# Patient Record
Sex: FEMALE | Race: WHITE | NOT HISPANIC OR LATINO | Employment: FULL TIME | URBAN - METROPOLITAN AREA
[De-identification: names, ages, dates, MRNs, and addresses within clinical notes are randomized per-mention and may not be internally consistent; named-entity substitution may affect disease eponyms.]

---

## 2017-10-20 ENCOUNTER — OFFICE VISIT (OUTPATIENT)
Dept: URGENT CARE | Facility: PHYSICIAN GROUP | Age: 57
End: 2017-10-20
Payer: COMMERCIAL

## 2017-10-20 VITALS
BODY MASS INDEX: 35.73 KG/M2 | WEIGHT: 182 LBS | HEART RATE: 96 BPM | DIASTOLIC BLOOD PRESSURE: 98 MMHG | RESPIRATION RATE: 16 BRPM | OXYGEN SATURATION: 100 % | SYSTOLIC BLOOD PRESSURE: 150 MMHG | TEMPERATURE: 98 F | HEIGHT: 60 IN

## 2017-10-20 DIAGNOSIS — R31.9 URINARY TRACT INFECTION WITH HEMATURIA, SITE UNSPECIFIED: ICD-10-CM

## 2017-10-20 DIAGNOSIS — R39.9 UTI SYMPTOMS: ICD-10-CM

## 2017-10-20 DIAGNOSIS — N39.0 URINARY TRACT INFECTION WITH HEMATURIA, SITE UNSPECIFIED: ICD-10-CM

## 2017-10-20 DIAGNOSIS — R30.0 DYSURIA: ICD-10-CM

## 2017-10-20 LAB
APPEARANCE UR: CLEAR
BILIRUB UR STRIP-MCNC: NORMAL MG/DL
COLOR UR AUTO: NORMAL
GLUCOSE UR STRIP.AUTO-MCNC: NORMAL MG/DL
KETONES UR STRIP.AUTO-MCNC: NORMAL MG/DL
LEUKOCYTE ESTERASE UR QL STRIP.AUTO: NORMAL
NITRITE UR QL STRIP.AUTO: NORMAL
PH UR STRIP.AUTO: 5 [PH] (ref 5–8)
PROT UR QL STRIP: NORMAL MG/DL
RBC UR QL AUTO: NORMAL
SP GR UR STRIP.AUTO: 1.01
UROBILINOGEN UR STRIP-MCNC: NORMAL MG/DL

## 2017-10-20 PROCEDURE — 81002 URINALYSIS NONAUTO W/O SCOPE: CPT | Performed by: NURSE PRACTITIONER

## 2017-10-20 PROCEDURE — 99204 OFFICE O/P NEW MOD 45 MIN: CPT | Performed by: NURSE PRACTITIONER

## 2017-10-20 RX ORDER — NITROFURANTOIN 25; 75 MG/1; MG/1
100 CAPSULE ORAL EVERY 12 HOURS
Qty: 10 CAP | Refills: 0 | Status: SHIPPED | OUTPATIENT
Start: 2017-10-20 | End: 2017-10-25

## 2017-10-20 ASSESSMENT — ENCOUNTER SYMPTOMS
FEVER: 0
DIZZINESS: 0
ABDOMINAL PAIN: 1
BACK PAIN: 1
DIARRHEA: 0
NAUSEA: 0
FLANK PAIN: 0
VOMITING: 0
CHILLS: 0
CONSTIPATION: 0
HEADACHES: 0
WEAKNESS: 0
MYALGIAS: 1

## 2017-10-21 NOTE — PATIENT INSTRUCTIONS
Urinary Tract Infection  A urinary tract infection (UTI) can occur any place along the urinary tract. The tract includes the kidneys, ureters, bladder, and urethra. A type of germ called bacteria often causes a UTI. UTIs are often helped with antibiotic medicine.   HOME CARE   · If given, take antibiotics as told by your doctor. Finish them even if you start to feel better.  · Drink enough fluids to keep your pee (urine) clear or pale yellow.  · Avoid tea, drinks with caffeine, and bubbly (carbonated) drinks.  · Pee often. Avoid holding your pee in for a long time.  · Pee before and after having sex (intercourse).  · Wipe from front to back after you poop (bowel movement) if you are a woman. Use each tissue only once.  GET HELP RIGHT AWAY IF:   · You have back pain.  · You have lower belly (abdominal) pain.  · You have chills.  · You feel sick to your stomach (nauseous).  · You throw up (vomit).  · Your burning or discomfort with peeing does not go away.  · You have a fever.  · Your symptoms are not better in 3 days.  MAKE SURE YOU:   · Understand these instructions.  · Will watch your condition.  · Will get help right away if you are not doing well or get worse.     This information is not intended to replace advice given to you by your health care provider. Make sure you discuss any questions you have with your health care provider.     Document Released: 06/05/2009 Document Revised: 01/08/2016 Document Reviewed: 07/18/2013  [employer] Interactive Patient Education ©2016 [employer]

## 2017-10-21 NOTE — PROGRESS NOTES
Subjective:      Simi Massey is a 57 y.o. female who presents with Urinary Frequency (low back pain starting last night )            HPI  Simi is a 57 year old female who is here for urinary problems since last night. States has been on a road trip last week. Denies fever, n/v, diarrhea. Has low pelvic pressure, bilat low back pain.    PMH:  has no past medical history of Breast cancer (CMS-HCC).  MEDS:   Current Outpatient Prescriptions:   •  nitrofurantoin monohydr macro (MACROBID) 100 MG Cap, Take 1 Cap by mouth every 12 hours for 5 days., Disp: 10 Cap, Rfl: 0  •  aspirin (ASA) 325 MG TABS, Take 650 mg by mouth 2 times a day as needed., Disp: , Rfl:   •  Chlorphen-Phenyleph-ASA (JE-SELTZER PLUS COLD PO), Take  by mouth., Disp: , Rfl:   •  hydrocod polst-chlorphen polst (TUSSIONEX PENNKINETIC ER) 10-8 MG/5ML LQCR, Take 5 mL by mouth every 12 hours as needed for Cough., Disp: 120 mL, Rfl: 0  •  Cholecalciferol (VITAMIN D3) 2000 UNIT CAPS, Take 1 Cap by mouth every day., Disp: , Rfl:   •  B Complex Vitamins (VITAMIN B COMPLEX) CAPS, Take 1 Cap by mouth every day.  , Disp: , Rfl:   •  lipoic acid POWD, Take 200 mg by mouth every day.  , Disp: , Rfl:   •  LYSINE, Take 1 Tab by mouth every day.  , Disp: , Rfl:   •  Coenzyme Q10 (CO Q 10) 10 MG CAPS, Take 1 Cap by mouth every day. With red yeast rice , Disp: , Rfl:   •  Magnesium Hydroxide (MAGNESIA PO), Take 1 Tab by mouth every day.  , Disp: , Rfl:   ALLERGIES:   Allergies   Allergen Reactions   • Nkda [No Known Drug Allergy]      SURGHX:   Past Surgical History:   Procedure Laterality Date   • APPENDECTOMY      1965     SOCHX:  reports that she has been smoking Cigarettes.  She has been smoking about 1.00 pack per day. She has never used smokeless tobacco. She reports that she does not drink alcohol or use drugs.  FH: Family history was reviewed, no pertinent findings to report    Review of Systems   Constitutional: Negative for chills, fever and  malaise/fatigue.   Gastrointestinal: Positive for abdominal pain. Negative for constipation, diarrhea, nausea and vomiting.   Genitourinary: Positive for dysuria, frequency and urgency. Negative for flank pain and hematuria.   Musculoskeletal: Positive for back pain and myalgias.   Neurological: Negative for dizziness, weakness and headaches.   All other systems reviewed and are negative.         Objective:     /98   Pulse 96   Temp 36.7 °C (98 °F)   Resp 16   Ht 1.524 m (5')   Wt 82.6 kg (182 lb)   SpO2 100%   BMI 35.54 kg/m²      Physical Exam   Constitutional: She is oriented to person, place, and time. Vital signs are normal. She appears well-developed and well-nourished. She is active and cooperative.  Non-toxic appearance. She does not have a sickly appearance. She does not appear ill. No distress.   HENT:   Head: Normocephalic.   Eyes: Conjunctivae and EOM are normal. Pupils are equal, round, and reactive to light.   Neck: Normal range of motion. Neck supple.   Cardiovascular: Normal rate and regular rhythm.    Pulmonary/Chest: Effort normal and breath sounds normal.   Abdominal: Soft. Bowel sounds are normal. She exhibits no distension. There is no tenderness. There is no rigidity, no rebound, no guarding and no CVA tenderness.   Musculoskeletal: Normal range of motion.        Lumbar back: She exhibits normal range of motion, no tenderness, no bony tenderness, no swelling, no edema, no deformity, no pain and no spasm.   Neurological: She is alert and oriented to person, place, and time.   Skin: Skin is warm and dry. She is not diaphoretic.   Vitals reviewed.    POC Color Straw Negative Final   POC Appearance Clear Negative Final   POC Leukocyte Esterase Neg Negative Final   POC Nitrites Neg Negative Final   POC Urobiligen Neg Negative (0.2) mg/dL Final   POC Protein Neg Negative mg/dL Final   POC Urine PH 5.0 5.0 - 8.0 Final   POC Blood Trace Negative Final   POC Specific Gravity 1.015 <1.005 -  >1.030 Final   POC Ketones Trace Negative mg/dL Final   POC Biliruben Neg Negative mg/dL Final   POC Glucose Neg Negative mg/dL Final             Declines urine culture at this time, just treatment  Assessment/Plan:     1. Dysuria    - POCT Urinalysis    2. Urinary tract infection with hematuria, site unspecified    - POCT Urinalysis    3. UTI symptoms    - nitrofurantoin monohydr macro (MACROBID) 100 MG Cap; Take 1 Cap by mouth every 12 hours for 5 days.  Dispense: 10 Cap; Refill: 0    Increase water intake  Urinate more frequently and empty bladder completely  Practice good toileting hygiene after bowel movements and sexual intercourse, refrain from sexual intercourse until infection cleared  Monitor for back/flank pain, difficulty urinating, blood in urine- need re-evaluation

## 2018-05-12 ENCOUNTER — OFFICE VISIT (OUTPATIENT)
Dept: URGENT CARE | Facility: PHYSICIAN GROUP | Age: 58
End: 2018-05-12
Payer: COMMERCIAL

## 2018-05-12 VITALS
TEMPERATURE: 99 F | HEIGHT: 60 IN | SYSTOLIC BLOOD PRESSURE: 140 MMHG | OXYGEN SATURATION: 96 % | BODY MASS INDEX: 35.77 KG/M2 | DIASTOLIC BLOOD PRESSURE: 90 MMHG | HEART RATE: 82 BPM | WEIGHT: 182.2 LBS

## 2018-05-12 DIAGNOSIS — B02.9 HERPES ZOSTER WITHOUT COMPLICATION: ICD-10-CM

## 2018-05-12 PROCEDURE — 99213 OFFICE O/P EST LOW 20 MIN: CPT | Performed by: NURSE PRACTITIONER

## 2018-05-12 RX ORDER — VALACYCLOVIR HYDROCHLORIDE 1 G/1
1000 TABLET, FILM COATED ORAL 3 TIMES DAILY
Qty: 21 TAB | Refills: 0 | Status: SHIPPED | OUTPATIENT
Start: 2018-05-12 | End: 2018-05-21

## 2018-05-12 ASSESSMENT — ENCOUNTER SYMPTOMS
MUSCULOSKELETAL NEGATIVE: 1
NEUROLOGICAL NEGATIVE: 1
CONSTITUTIONAL NEGATIVE: 1
MYALGIAS: 0
CARDIOVASCULAR NEGATIVE: 1
RESPIRATORY NEGATIVE: 1

## 2018-05-12 NOTE — PROGRESS NOTES
Subjective:      Simi Massey is a 58 y.o. female who presents with Rash (rash located on back, poss shingles, pt diagnosed with shingles before, onset at 9am )            HPI  Pt states that she noticed a rash to her back this am. Hx of shingles a few years ago and rash is in the same spot.   No pain, itchiness or burning sensation  No fevers  + stress  No other complaints  No other rash noted to body  No fevers      PMH:  has no past medical history of Breast cancer (HCC).  MEDS:   Current Outpatient Prescriptions:   •  valacyclovir (VALTREX) 1 GM Tab, Take 1 Tab by mouth 3 times a day., Disp: 21 Tab, Rfl: 0  •  Chlorphen-Phenyleph-ASA (JE-SELTZER PLUS COLD PO), Take  by mouth., Disp: , Rfl:   •  Cholecalciferol (VITAMIN D3) 2000 UNIT CAPS, Take 1 Cap by mouth every day., Disp: , Rfl:   •  B Complex Vitamins (VITAMIN B COMPLEX) CAPS, Take 1 Cap by mouth every day.  , Disp: , Rfl:   •  lipoic acid POWD, Take 200 mg by mouth every day.  , Disp: , Rfl:   •  LYSINE, Take 1 Tab by mouth every day.  , Disp: , Rfl:   •  Coenzyme Q10 (CO Q 10) 10 MG CAPS, Take 1 Cap by mouth every day. With red yeast rice , Disp: , Rfl:   •  Magnesium Hydroxide (MAGNESIA PO), Take 1 Tab by mouth every day.  , Disp: , Rfl:   •  aspirin (ASA) 325 MG TABS, Take 650 mg by mouth 2 times a day as needed., Disp: , Rfl:   •  hydrocod polst-chlorphen polst (TUSSIONEX PENNKINETIC ER) 10-8 MG/5ML LQCR, Take 5 mL by mouth every 12 hours as needed for Cough., Disp: 120 mL, Rfl: 0  ALLERGIES:   Allergies   Allergen Reactions   • Nkda [No Known Drug Allergy]      SURGHX:   Past Surgical History:   Procedure Laterality Date   • APPENDECTOMY      1965     SOCHX:  reports that she has been smoking Cigarettes.  She has been smoking about 1.00 pack per day. She has never used smokeless tobacco. She reports that she does not drink alcohol or use drugs.  FH: family history includes Lung Disease in her father and mother.      Review of Systems      Constitutional: Negative.  Negative for malaise/fatigue.   Respiratory: Negative.    Cardiovascular: Negative.    Musculoskeletal: Negative.  Negative for myalgias.   Skin: Positive for rash.   Neurological: Negative.           Objective:     /90   Pulse 82   Temp 37.2 °C (99 °F)   Ht 1.524 m (5')   Wt 82.6 kg (182 lb 3.2 oz)   SpO2 96%   BMI 35.58 kg/m²      Physical Exam   Constitutional: She is oriented to person, place, and time. She appears well-developed and well-nourished.   HENT:   Head: Normocephalic and atraumatic.   Eyes: Conjunctivae are normal.   Neck: Normal range of motion.   Pulmonary/Chest: Effort normal.   Musculoskeletal: Normal range of motion.   Neurological: She is alert and oriented to person, place, and time.   Skin: Skin is warm and dry. Capillary refill takes less than 2 seconds.        Scattered lesions noted to left upper back  No vesicals forming.  No signs of secondary infection   Psychiatric: She has a normal mood and affect. Her behavior is normal. Judgment and thought content normal.               Assessment/Plan:     There are no diagnoses linked to this encounter.

## 2018-05-12 NOTE — PATIENT INSTRUCTIONS
Shingles  Shingles is an infection that causes a painful skin rash and fluid-filled blisters. Shingles is caused by the same virus that causes chickenpox.  Shingles only develops in people who:  · Have had chickenpox.  · Have gotten the chickenpox vaccine. (This is rare.)  The first symptoms of shingles may be itching, tingling, or pain in an area on your skin. A rash will follow in a few days or weeks. The rash is usually on one side of the body in a bandlike or beltlike pattern. Over time, the rash turns into fluid-filled blisters that break open, scab over, and dry up. Medicines may:  · Help you manage pain.  · Help you recover more quickly.  · Help to prevent long-term problems.  Follow these instructions at home:  Medicines  · Take medicines only as told by your doctor.  · Apply an anti-itch or numbing cream to the affected area as told by your doctor.  Blister and Rash Care  · Take a cool bath or put cool compresses on the area of the rash or blisters as told by your doctor. This may help with pain and itching.  · Keep your rash covered with a loose bandage (dressing). Wear loose-fitting clothing.  · Keep your rash and blisters clean with mild soap and cool water or as told by your doctor.  · Check your rash every day for signs of infection. These include redness, swelling, and pain that lasts or gets worse.  · Do not pick your blisters.  · Do not scratch your rash.  General instructions  · Rest as told by your doctor.  · Keep all follow-up visits as told by your doctor. This is important.  · Until your blisters scab over, your infection can cause chickenpox in people who have never had it or been vaccinated against it. To prevent this from happening, avoid touching other people or being around other people, especially:  ¨ Babies.  ¨ Pregnant women.  ¨ Children who have eczema.  ¨ Elderly people who have transplants.  ¨ People who have chronic illnesses, such as leukemia or AIDS.  Contact a doctor if:  · Your  pain does not get better with medicine.  · Your pain does not get better after the rash heals.  · Your rash looks infected. Signs of infection include:  ¨ Redness.  ¨ Swelling.  ¨ Pain that lasts or gets worse.  Get help right away if:  · The rash is on your face or nose.  · You have pain in your face, pain around your eye area, or loss of feeling on one side of your face.  · You have ear pain or you have ringing in your ear.  · You have loss of taste.  · Your condition gets worse.  This information is not intended to replace advice given to you by your health care provider. Make sure you discuss any questions you have with your health care provider.  Document Released: 06/05/2009 Document Revised: 08/13/2017 Document Reviewed: 09/29/2015  [employer] Interactive Patient Education © 2017 [employer]

## 2018-05-21 ENCOUNTER — OFFICE VISIT (OUTPATIENT)
Dept: INTERNAL MEDICINE | Facility: MEDICAL CENTER | Age: 58
End: 2018-05-21
Payer: COMMERCIAL

## 2018-05-21 VITALS
HEIGHT: 63 IN | DIASTOLIC BLOOD PRESSURE: 98 MMHG | HEART RATE: 80 BPM | TEMPERATURE: 97 F | OXYGEN SATURATION: 97 % | WEIGHT: 180 LBS | SYSTOLIC BLOOD PRESSURE: 166 MMHG | BODY MASS INDEX: 31.89 KG/M2

## 2018-05-21 DIAGNOSIS — Z76.89 ENCOUNTER TO ESTABLISH CARE: ICD-10-CM

## 2018-05-21 DIAGNOSIS — R79.89 ABNORMAL CBC: ICD-10-CM

## 2018-05-21 DIAGNOSIS — B02.9 HERPES ZOSTER WITHOUT COMPLICATION: ICD-10-CM

## 2018-05-21 DIAGNOSIS — R03.0 ELEVATED BP WITHOUT DIAGNOSIS OF HYPERTENSION: ICD-10-CM

## 2018-05-21 DIAGNOSIS — Z13.228 SCREENING FOR METABOLIC DISORDER: ICD-10-CM

## 2018-05-21 DIAGNOSIS — Z12.39 SCREENING FOR BREAST CANCER: ICD-10-CM

## 2018-05-21 DIAGNOSIS — E55.9 VITAMIN D INSUFFICIENCY: ICD-10-CM

## 2018-05-21 DIAGNOSIS — Z11.9 SCREENING EXAMINATION FOR INFECTIOUS DISEASE: ICD-10-CM

## 2018-05-21 DIAGNOSIS — Z12.11 SCREEN FOR COLON CANCER: ICD-10-CM

## 2018-05-21 PROCEDURE — 99386 PREV VISIT NEW AGE 40-64: CPT | Mod: GC | Performed by: INTERNAL MEDICINE

## 2018-05-21 RX ORDER — AMPICILLIN TRIHYDRATE 250 MG
CAPSULE ORAL
COMMUNITY
End: 2018-10-25

## 2018-05-21 ASSESSMENT — PATIENT HEALTH QUESTIONNAIRE - PHQ9: CLINICAL INTERPRETATION OF PHQ2 SCORE: 0

## 2018-05-21 ASSESSMENT — ENCOUNTER SYMPTOMS
PALPITATIONS: 0
VOMITING: 0
COUGH: 0
WEIGHT LOSS: 0
EYE PAIN: 0
SENSORY CHANGE: 0
BLOOD IN STOOL: 0
HEADACHES: 0
ABDOMINAL PAIN: 0
MYALGIAS: 0
SINUS PAIN: 0
SHORTNESS OF BREATH: 0
CHILLS: 0
INSOMNIA: 0
SPUTUM PRODUCTION: 0
SPEECH CHANGE: 0
FEVER: 0
DEPRESSION: 0
CONSTIPATION: 0
EYE DISCHARGE: 0
NAUSEA: 0

## 2018-05-21 ASSESSMENT — LIFESTYLE VARIABLES: SUBSTANCE_ABUSE: 0

## 2018-05-21 NOTE — PROGRESS NOTES
Annual Wellness Exam    CC: Wellness visit.    HPI: Patient is a 58 y.o female here for an annual wellness exam. Patient's preventive service needs are listedin the assessment and plan. Patient has not seen a PCP in 5 years and would like to be evaluated and treated for any active issues. She has no complaints at this time.    ROS:  Review of Systems   Constitutional: Negative for chills, fever, malaise/fatigue and weight loss.   HENT: Negative for congestion, ear discharge, hearing loss and sinus pain.    Eyes: Negative for pain and discharge.   Respiratory: Negative for cough, sputum production and shortness of breath.    Cardiovascular: Negative for chest pain, palpitations and leg swelling.   Gastrointestinal: Negative for abdominal pain, blood in stool, constipation, nausea and vomiting.   Genitourinary: Negative for dysuria and urgency.   Musculoskeletal: Negative for myalgias.   Skin: Positive for rash. Negative for itching.   Neurological: Negative for sensory change, speech change and headaches.   Psychiatric/Behavioral: Negative for depression, substance abuse and suicidal ideas. The patient does not have insomnia.        Patient Active Problem List    Diagnosis Date Noted   • Herpes zoster without complication 05/21/2018   • Vitamin D insufficiency 06/14/2013   • Mood disorder (HCC) 01/18/2013   • Elevated blood pressure reading without diagnosis of hypertension 10/12/2012   • Menopause 10/12/2012   • Abnormal CBC 10/12/2012   • Sleep-disordered breathing 10/12/2012   • Hyperlipidemia 09/07/2012   • Elevated hemoglobin A1c 09/07/2012   • Snoring 09/07/2012   • Tobacco use 09/07/2012       Past Medical History:   Diagnosis Date   • Anxiety    • Depression    • Head ache    • Shingles        Current Outpatient Prescriptions   Medication Sig Dispense Refill   • Red Yeast Rice 600 MG Cap Take  by mouth.     • Cholecalciferol (VITAMIN D3) 5000 units Cap Take 1 Cap by mouth every day.      "  • B Complex Vitamins (VITAMIN B COMPLEX) CAPS Take 1 Cap by mouth every day.       • lipoic acid POWD Take 200 mg by mouth every day.       • LYSINE Take 1 Tab by mouth every day.       • Coenzyme Q10 (CO Q 10) 10 MG CAPS Take 1 Cap by mouth every day. With red yeast rice      • Magnesium Hydroxide (MAGNESIA PO) Take 1 Tab by mouth every day.       • aspirin (ASA) 325 MG TABS Take 650 mg by mouth 2 times a day as needed.       No current facility-administered medications for this visit.        Allergies as of 05/21/2018 - Reviewed 05/21/2018   Allergen Reaction Noted   • Nkda [no known drug allergy]  09/07/2012       Social History     Social History   • Marital status:      Spouse name: N/A   • Number of children: N/A   • Years of education: N/A     Occupational History   • Not on file.     Social History Main Topics   • Smoking status: Current Every Day Smoker     Packs/day: 1.00     Types: Cigarettes   • Smokeless tobacco: Never Used      Comment: trying to quit   • Alcohol use Yes      Comment: once or twice a year    • Drug use: No   • Sexual activity: Yes     Partners: Male     Other Topics Concern   • Not on file     Social History Narrative   • No narrative on file       Family History   Problem Relation Age of Onset   • Lung Disease Mother    • Cancer Maternal Grandmother      Lung       Past Surgical History:   Procedure Laterality Date   • APPENDECTOMY      1965       Physical Exam:  BP (!) 166/98   Pulse 80   Temp 36.1 °C (97 °F)   Ht 1.6 m (5' 3\")   Wt 81.6 kg (180 lb)   SpO2 97%   BMI 31.89 kg/m²   Physical Exam   Constitutional: She is oriented to person, place, and time. She appears well-developed and well-nourished. No distress.   HENT:   Head: Normocephalic and atraumatic.   Right Ear: Tympanic membrane, external ear and ear canal normal.   Left Ear: Tympanic membrane, external ear and ear canal normal.   Eyes: Conjunctivae and EOM are normal. Pupils are equal, round, and reactive to " light.   Neck: Normal range of motion. Neck supple. No thyromegaly present.   Cardiovascular: Normal rate, regular rhythm, normal heart sounds and intact distal pulses.  Exam reveals no gallop and no friction rub.    No murmur heard.  Pulmonary/Chest: Effort normal and breath sounds normal. She has no wheezes. She has no rales.   Abdominal: Soft. Bowel sounds are normal. She exhibits no distension. There is no guarding.   Musculoskeletal: She exhibits no edema or deformity.   Neurological: She is alert and oriented to person, place, and time. No sensory deficit.   Skin: Skin is warm and dry. Rash (healing rash from recent shingles infection at about left  T8 dermatome postriorly) noted. She is not diaphoretic.         Note: I have reviewed all pertinent labs and diagnostic tests associated with this visit with specific comments listed under the assessment and plan below    Assessment and Plan:    Immunization:   · PNA: not indicated  · Influenza vaccination: patient not immunized, vaccine recommended.  · Tdap/TD: last TD vaccine in 2015  · Zoster vaccine: recommend Shingrix.  · HBV:  no history of immunization, screening recommended       · HAV:  no history of immunization, screening recommended        Comments: Screen for Hep A and B. Recommend patient get shingrix vaccine at pharmacy.    Infections:    · HCV: patient was born between [birth year], has high risk behavior screening recommended     · HBV:  no history of immunization, screening recommended      · HIV: patient was not screened before, screening recommended      · Syphilis, Chlamydia and Gonorrhea:  no high risk behavior, screening is not recommended   Comments: Screen for Hep A, B, and C.    Metabolic History:    · Coronary artery disease and stroke: lipid profile is abnormal,  ASCVD is incalculable as lipid profile 5 years old.   · HTN: hypertensive at this visit.   · Diabetes: patient has risk factors for developing DM, screening  recommended.  Comments: Patient without screening labs in the past 5 years. Get CMP, Lipid profile, A1C for further evaluation.    Cancer Screening:    · PAP Smear: last PAP smear was done in ?,  was normal , next PAP next visit.  · Mammogram: normal 5 years ago.  · Colonoscopy: normal 10 years ago.   · Lung cancer: 30 PPY, screening recommended.  Comments: Patient needs colonoscopy, mammogram, pap smear.    Lifestyle and Functioning:   · Smokin pack per day  · Physical activity: no formal exercise program, walks at work.  · Alcohol use: rarely  · Obesity: patient's BMI is 31.  · Depression: PHQ2 score 0.  Comments: Counseled patient on diet and exercise. Counseled patient on smoking cessation.    Elevated BP without diagnosis of hypertension  Patient had an elevated blood pressure reading at her visit. Reports no prior diagnosis of hypertension or ever being on medications.  - Recheck blood pressure at next visit; we'll possibly start patient on treatment.    Vitamin D deficiency  Patient has history of vitamin D deficiency, not currently on a supplement, no recent vitamin D levels.  - Vitamin D level    History of abnormal CBC  - Repeat CBC for follow-up      Followup: Return in about 2 weeks (around 2018) for Long; for PAP, HTN, and labs.    Signed by: Radha Garsia M.D.

## 2018-05-30 ENCOUNTER — HOSPITAL ENCOUNTER (OUTPATIENT)
Dept: LAB | Facility: MEDICAL CENTER | Age: 58
End: 2018-05-30
Attending: STUDENT IN AN ORGANIZED HEALTH CARE EDUCATION/TRAINING PROGRAM
Payer: COMMERCIAL

## 2018-05-30 DIAGNOSIS — E55.9 VITAMIN D INSUFFICIENCY: ICD-10-CM

## 2018-05-30 DIAGNOSIS — Z11.9 SCREENING EXAMINATION FOR INFECTIOUS DISEASE: ICD-10-CM

## 2018-05-30 DIAGNOSIS — Z13.228 SCREENING FOR METABOLIC DISORDER: ICD-10-CM

## 2018-05-30 DIAGNOSIS — Z76.89 ENCOUNTER TO ESTABLISH CARE: ICD-10-CM

## 2018-05-30 LAB
25(OH)D3 SERPL-MCNC: 38 NG/ML (ref 30–100)
ALBUMIN SERPL BCP-MCNC: 4.2 G/DL (ref 3.2–4.9)
ALBUMIN/GLOB SERPL: 1.7 G/DL
ALP SERPL-CCNC: 65 U/L (ref 30–99)
ALT SERPL-CCNC: 14 U/L (ref 2–50)
ANION GAP SERPL CALC-SCNC: 6 MMOL/L (ref 0–11.9)
AST SERPL-CCNC: 14 U/L (ref 12–45)
BASOPHILS # BLD AUTO: 1 % (ref 0–1.8)
BASOPHILS # BLD: 0.08 K/UL (ref 0–0.12)
BILIRUB SERPL-MCNC: 0.5 MG/DL (ref 0.1–1.5)
BUN SERPL-MCNC: 8 MG/DL (ref 8–22)
CALCIUM SERPL-MCNC: 9.3 MG/DL (ref 8.5–10.5)
CHLORIDE SERPL-SCNC: 106 MMOL/L (ref 96–112)
CHOLEST SERPL-MCNC: 204 MG/DL (ref 100–199)
CO2 SERPL-SCNC: 28 MMOL/L (ref 20–33)
CREAT SERPL-MCNC: 0.69 MG/DL (ref 0.5–1.4)
EOSINOPHIL # BLD AUTO: 0.18 K/UL (ref 0–0.51)
EOSINOPHIL NFR BLD: 2.1 % (ref 0–6.9)
ERYTHROCYTE [DISTWIDTH] IN BLOOD BY AUTOMATED COUNT: 46.8 FL (ref 35.9–50)
EST. AVERAGE GLUCOSE BLD GHB EST-MCNC: 123 MG/DL
GLOBULIN SER CALC-MCNC: 2.5 G/DL (ref 1.9–3.5)
GLUCOSE SERPL-MCNC: 99 MG/DL (ref 65–99)
HBA1C MFR BLD: 5.9 % (ref 0–5.6)
HBV SURFACE AB SERPL IA-ACNC: <3.1 MIU/ML (ref 0–10)
HCT VFR BLD AUTO: 45.9 % (ref 37–47)
HCV AB SER QL: NEGATIVE
HDLC SERPL-MCNC: 55 MG/DL
HGB BLD-MCNC: 15.2 G/DL (ref 12–16)
HIV 1+2 AB+HIV1 P24 AG SERPL QL IA: NON REACTIVE
IMM GRANULOCYTES # BLD AUTO: 0.02 K/UL (ref 0–0.11)
IMM GRANULOCYTES NFR BLD AUTO: 0.2 % (ref 0–0.9)
LDLC SERPL CALC-MCNC: 129 MG/DL
LYMPHOCYTES # BLD AUTO: 1.91 K/UL (ref 1–4.8)
LYMPHOCYTES NFR BLD: 22.7 % (ref 22–41)
MCH RBC QN AUTO: 30.5 PG (ref 27–33)
MCHC RBC AUTO-ENTMCNC: 33.1 G/DL (ref 33.6–35)
MCV RBC AUTO: 92 FL (ref 81.4–97.8)
MONOCYTES # BLD AUTO: 0.64 K/UL (ref 0–0.85)
MONOCYTES NFR BLD AUTO: 7.6 % (ref 0–13.4)
NEUTROPHILS # BLD AUTO: 5.57 K/UL (ref 2–7.15)
NEUTROPHILS NFR BLD: 66.4 % (ref 44–72)
NRBC # BLD AUTO: 0 K/UL
NRBC BLD-RTO: 0 /100 WBC
PLATELET # BLD AUTO: 286 K/UL (ref 164–446)
PMV BLD AUTO: 10.1 FL (ref 9–12.9)
POTASSIUM SERPL-SCNC: 4.1 MMOL/L (ref 3.6–5.5)
PROT SERPL-MCNC: 6.7 G/DL (ref 6–8.2)
RBC # BLD AUTO: 4.99 M/UL (ref 4.2–5.4)
SODIUM SERPL-SCNC: 140 MMOL/L (ref 135–145)
TRIGL SERPL-MCNC: 99 MG/DL (ref 0–149)
WBC # BLD AUTO: 8.4 K/UL (ref 4.8–10.8)

## 2018-05-30 PROCEDURE — 86803 HEPATITIS C AB TEST: CPT

## 2018-05-30 PROCEDURE — 80061 LIPID PANEL: CPT

## 2018-05-30 PROCEDURE — 85025 COMPLETE CBC W/AUTO DIFF WBC: CPT

## 2018-05-30 PROCEDURE — 86708 HEPATITIS A ANTIBODY: CPT

## 2018-05-30 PROCEDURE — 80053 COMPREHEN METABOLIC PANEL: CPT

## 2018-05-30 PROCEDURE — 83036 HEMOGLOBIN GLYCOSYLATED A1C: CPT

## 2018-05-30 PROCEDURE — 86706 HEP B SURFACE ANTIBODY: CPT

## 2018-05-30 PROCEDURE — 36415 COLL VENOUS BLD VENIPUNCTURE: CPT

## 2018-05-30 PROCEDURE — 87389 HIV-1 AG W/HIV-1&-2 AB AG IA: CPT

## 2018-05-30 PROCEDURE — 82306 VITAMIN D 25 HYDROXY: CPT

## 2018-05-31 LAB — HAV AB SER QL IA: NEGATIVE

## 2018-06-02 ENCOUNTER — APPOINTMENT (OUTPATIENT)
Dept: RADIOLOGY | Facility: MEDICAL CENTER | Age: 58
End: 2018-06-02
Attending: STUDENT IN AN ORGANIZED HEALTH CARE EDUCATION/TRAINING PROGRAM
Payer: COMMERCIAL

## 2018-06-07 ENCOUNTER — OFFICE VISIT (OUTPATIENT)
Dept: INTERNAL MEDICINE | Facility: MEDICAL CENTER | Age: 58
End: 2018-06-07
Payer: COMMERCIAL

## 2018-06-07 ENCOUNTER — HOSPITAL ENCOUNTER (OUTPATIENT)
Facility: MEDICAL CENTER | Age: 58
End: 2018-06-07
Attending: STUDENT IN AN ORGANIZED HEALTH CARE EDUCATION/TRAINING PROGRAM
Payer: COMMERCIAL

## 2018-06-07 VITALS
OXYGEN SATURATION: 96 % | DIASTOLIC BLOOD PRESSURE: 86 MMHG | SYSTOLIC BLOOD PRESSURE: 144 MMHG | HEART RATE: 84 BPM | WEIGHT: 178.6 LBS | TEMPERATURE: 98.2 F | BODY MASS INDEX: 31.64 KG/M2 | RESPIRATION RATE: 18 BRPM | HEIGHT: 63 IN

## 2018-06-07 DIAGNOSIS — Z72.0 TOBACCO USE: ICD-10-CM

## 2018-06-07 DIAGNOSIS — I10 ESSENTIAL HYPERTENSION: ICD-10-CM

## 2018-06-07 DIAGNOSIS — Z12.4 PAP SMEAR FOR CERVICAL CANCER SCREENING: ICD-10-CM

## 2018-06-07 DIAGNOSIS — Z23 NEED FOR VACCINATION: ICD-10-CM

## 2018-06-07 PROBLEM — B02.9 HERPES ZOSTER WITHOUT COMPLICATION: Status: RESOLVED | Noted: 2018-05-21 | Resolved: 2018-06-07

## 2018-06-07 PROCEDURE — 88175 CYTOPATH C/V AUTO FLUID REDO: CPT

## 2018-06-07 PROCEDURE — 90471 IMMUNIZATION ADMIN: CPT | Performed by: INTERNAL MEDICINE

## 2018-06-07 PROCEDURE — 99000 SPECIMEN HANDLING OFFICE-LAB: CPT | Performed by: INTERNAL MEDICINE

## 2018-06-07 PROCEDURE — 99396 PREV VISIT EST AGE 40-64: CPT | Mod: GC,25 | Performed by: INTERNAL MEDICINE

## 2018-06-07 PROCEDURE — 87624 HPV HI-RISK TYP POOLED RSLT: CPT

## 2018-06-07 PROCEDURE — 90636 HEP A/HEP B VACC ADULT IM: CPT | Performed by: INTERNAL MEDICINE

## 2018-06-07 ASSESSMENT — PAIN SCALES - GENERAL: PAINLEVEL: NO PAIN

## 2018-06-07 ASSESSMENT — ENCOUNTER SYMPTOMS: GENERAL WELL-BEING: GOOD

## 2018-06-07 ASSESSMENT — ACTIVITIES OF DAILY LIVING (ADL): BATHING_REQUIRES_ASSISTANCE: 0

## 2018-06-07 ASSESSMENT — PATIENT HEALTH QUESTIONNAIRE - PHQ9: CLINICAL INTERPRETATION OF PHQ2 SCORE: 0

## 2018-06-07 NOTE — PROGRESS NOTES
Established Patient    Simi presents today with the following:    CC: Women's health exam.    HPI:     Patient here for Pap and pelvic exam. Previous pelvic exams have all been normal.  Patient denies any current symptoms: No dyspareunia, no discharge, no lesions.  Patient denies family history of GYN or breast cancers.  Patient has had previous pregnancies, without significant complications.  Patient's menstrual history is unremarkable.  Patient is not in need of contraception at this time.  Patient denies any STD risks or concerns.  Patient denies any history of sexual abuse.   Patient denies breast masses or lesions.      Patient Active Problem List    Diagnosis Date Noted   • Herpes zoster without complication 05/21/2018   • Vitamin D insufficiency 06/14/2013   • Mood disorder (HCC) 01/18/2013   • Elevated blood pressure reading without diagnosis of hypertension 10/12/2012   • Menopause 10/12/2012   • Abnormal CBC 10/12/2012   • Sleep-disordered breathing 10/12/2012   • Hyperlipidemia 09/07/2012   • Elevated hemoglobin A1c 09/07/2012   • Snoring 09/07/2012   • Tobacco use 09/07/2012       Current Outpatient Prescriptions   Medication Sig Dispense Refill   • Red Yeast Rice 600 MG Cap Take  by mouth.     • Cholecalciferol (VITAMIN D3) 5000 units Cap Take 1 Cap by mouth every day.     • B Complex Vitamins (VITAMIN B COMPLEX) CAPS Take 1 Cap by mouth every day.       • lipoic acid POWD Take 200 mg by mouth every day.       • LYSINE Take 1 Tab by mouth every day.       • Coenzyme Q10 (CO Q 10) 10 MG CAPS Take 1 Cap by mouth every day. With red yeast rice      • Magnesium Hydroxide (MAGNESIA PO) Take 1 Tab by mouth every day.       • aspirin (ASA) 325 MG TABS Take 650 mg by mouth 2 times a day as needed.       No current facility-administered medications for this visit.        ROS: As per HPI. Additional pertinent symptoms as noted below.    All others negative    /86 Comment: repeat bp 5 minutes  Pulse  "84   Temp 36.8 °C (98.2 °F)   Resp 18   Ht 1.6 m (5' 3\")   Wt 81 kg (178 lb 9.6 oz)   SpO2 96%   Breastfeeding? No   BMI 31.64 kg/m²     Physical Exam   General:   No distress.  Normal appearing.  HEENT: Pupils are equal.  Conjunctiva is normal.  Head is normal appearing.   Pulmonary: Clear to auscultation, with good breath sounds.  Cardiovascular regular rate and rhythm.  No murmur auscultated. No carotid bruits.  Abdomen: Soft, nontender, nondistended.  Normal bowel sounds.  Organs are not enlarged.  Neurologic: Cranial nerves intact.  Strength and sensation are normal.  Skin: No obvious lesions.  Lymph: No cervical, supraclavicular, axillary nodes noted.  Genitourinary: External genitalia are normal in appearance. Speculum exam- normal cervix and mucosa. Pap collected without complications. Normal, mobile, non-tender uterus, no adnexal masses.  Breast: Bilateral breasts are normal in appearance. Nipple and areola are normal in appearance. No abnormal skin texture.      Note: I have reviewed all pertinent labs and diagnostic tests associated with this visit with specific comments listed under the assessment and plan below    Assessment and Plan    1. Pap smear for cervical cancer screening  - thin prep with HPV    2. Need for vaccination  - Twinrix 1st dose today    3. Hypertension  Patient with elevated blood pressure on today's visit; patient is asymptomatic. This is her 2nd consecutive elevated reading in our office. She was reluctant to start any medications at this time and would prefer to try lifestyle modifications in order to decrease her blood pressure.  - Counseled patient on the DASH diet  - Patient on exercise  - Instructed patient to keep blood pressure log for the next month and to bring it with her to her next visit  - If the patient's blood pressures elevated at next visit, we will start on medications    4. Tobacco use  Patient is working on quitting smoking and has now cut down her " cigarette intake to 9 cigarettes per day. She has not been able to completely quit smoking due to life stressors, but endorses that she will continue to work on tobacco cessation.  - Counseled patient on smoking cessation.      Followup: Return in about 1 month (around 7/7/2018) for Long.      Signed by: Radha Garsia M.D.

## 2018-06-07 NOTE — PROGRESS NOTES
Simi Massey is a 58 y.o. female here for a non-provider visit for:   TWINRIX (Hep A/Hep B) 1 of 1    Reason for immunization: Overdue/Provider Recommended  Immunization records indicate need for vaccine: Yes, confirmed with [place] [facility]  Minimum interval has been met for this vaccine: No  ABN completed: Not Indicated    Order and dose verified by: ALEXIA BARRERA Dated  07/20/2016 was given to patient: Yes  IAC Questionnaire abnormal. Questionnaire reviewed and administration of injection approved by provider: SCAR    Patient tolerated injection and no adverse effects were observed or reported: Yes    Pt scheduled for next dose in series: Not Indicated

## 2018-06-07 NOTE — PATIENT INSTRUCTIONS
"DASH Eating Plan  DASH stands for \"Dietary Approaches to Stop Hypertension.\" The DASH eating plan is a healthy eating plan that has been shown to reduce high blood pressure (hypertension). Additional health benefits may include reducing the risk of type 2 diabetes mellitus, heart disease, and stroke. The DASH eating plan may also help with weight loss.  What do I need to know about the DASH eating plan?  For the DASH eating plan, you will follow these general guidelines:  · Choose foods with less than 150 milligrams of sodium per serving (as listed on the food label).  · Use salt-free seasonings or herbs instead of table salt or sea salt.  · Check with your health care provider or pharmacist before using salt substitutes.  · Eat lower-sodium products. These are often labeled as \"low-sodium\" or \"no salt added.\"  · Eat fresh foods. Avoid eating a lot of canned foods.  · Eat more vegetables, fruits, and low-fat dairy products.  · Choose whole grains. Look for the word \"whole\" as the first word in the ingredient list.  · Choose fish and skinless chicken or turkey more often than red meat. Limit fish, poultry, and meat to 6 oz (170 g) each day.  · Limit sweets, desserts, sugars, and sugary drinks.  · Choose heart-healthy fats.  · Eat more home-cooked food and less restaurant, buffet, and fast food.  · Limit fried foods.  · Do not avila foods. Cook foods using methods such as baking, boiling, grilling, and broiling instead.  · When eating at a restaurant, ask that your food be prepared with less salt, or no salt if possible.  What foods can I eat?  Seek help from a dietitian for individual calorie needs.  Grains   Whole grain or whole wheat bread. Brown rice. Whole grain or whole wheat pasta. Quinoa, bulgur, and whole grain cereals. Low-sodium cereals. Corn or whole wheat flour tortillas. Whole grain cornbread. Whole grain crackers. Low-sodium crackers.  Vegetables   Fresh or frozen vegetables (raw, steamed, roasted, or " grilled). Low-sodium or reduced-sodium tomato and vegetable juices. Low-sodium or reduced-sodium tomato sauce and paste. Low-sodium or reduced-sodium canned vegetables.  Fruits   All fresh, canned (in natural juice), or frozen fruits.  Meat and Other Protein Products   Ground beef (85% or leaner), grass-fed beef, or beef trimmed of fat. Skinless chicken or turkey. Ground chicken or turkey. Pork trimmed of fat. All fish and seafood. Eggs. Dried beans, peas, or lentils. Unsalted nuts and seeds. Unsalted canned beans.  Dairy   Low-fat dairy products, such as skim or 1% milk, 2% or reduced-fat cheeses, low-fat ricotta or cottage cheese, or plain low-fat yogurt. Low-sodium or reduced-sodium cheeses.  Fats and Oils   Tub margarines without trans fats. Light or reduced-fat mayonnaise and salad dressings (reduced sodium). Avocado. Safflower, olive, or canola oils. Natural peanut or almond butter.  Other   Unsalted popcorn and pretzels.  The items listed above may not be a complete list of recommended foods or beverages. Contact your dietitian for more options.   What foods are not recommended?  Grains   White bread. White pasta. White rice. Refined cornbread. Bagels and croissants. Crackers that contain trans fat.  Vegetables   Creamed or fried vegetables. Vegetables in a cheese sauce. Regular canned vegetables. Regular canned tomato sauce and paste. Regular tomato and vegetable juices.  Fruits   Canned fruit in light or heavy syrup. Fruit juice.  Meat and Other Protein Products   Fatty cuts of meat. Ribs, chicken wings, lee, sausage, bologna, salami, chitterlings, fatback, hot dogs, bratwurst, and packaged luncheon meats. Salted nuts and seeds. Canned beans with salt.  Dairy   Whole or 2% milk, cream, half-and-half, and cream cheese. Whole-fat or sweetened yogurt. Full-fat cheeses or blue cheese. Nondairy creamers and whipped toppings. Processed cheese, cheese spreads, or cheese curds.  Condiments   Onion and garlic  salt, seasoned salt, table salt, and sea salt. Canned and packaged gravies. Worcestershire sauce. Tartar sauce. Barbecue sauce. Teriyaki sauce. Soy sauce, including reduced sodium. Steak sauce. Fish sauce. Oyster sauce. Cocktail sauce. Horseradish. Ketchup and mustard. Meat flavorings and tenderizers. Bouillon cubes. Hot sauce. Tabasco sauce. Marinades. Taco seasonings. Relishes.  Fats and Oils   Butter, stick margarine, lard, shortening, ghee, and lee fat. Coconut, palm kernel, or palm oils. Regular salad dressings.  Other   Pickles and olives. Salted popcorn and pretzels.  The items listed above may not be a complete list of foods and beverages to avoid. Contact your dietitian for more information.   Where can I find more information?  National [employer], [employer], and [employer] [place]: www.nhlbi.nih.gov/health/health-topics/topics/dash/  This information is not intended to replace advice given to you by your health care provider. Make sure you discuss any questions you have with your health care provider.  Document Released: 12/06/2012 Document Revised: 05/25/2017 Document Reviewed: 10/22/2014  Elsevier Interactive Patient Education © 2017 Elsevier Inc.

## 2018-06-08 DIAGNOSIS — Z12.4 PAP SMEAR FOR CERVICAL CANCER SCREENING: ICD-10-CM

## 2018-06-09 LAB
CYTOLOGY REG CYTOL: NORMAL
HPV HR 12 DNA CVX QL NAA+PROBE: NEGATIVE
HPV16 DNA SPEC QL NAA+PROBE: NEGATIVE
HPV18 DNA SPEC QL NAA+PROBE: NEGATIVE
SPECIMEN SOURCE: NORMAL

## 2018-06-13 ENCOUNTER — HOSPITAL ENCOUNTER (OUTPATIENT)
Dept: RADIOLOGY | Facility: MEDICAL CENTER | Age: 58
End: 2018-06-13
Attending: STUDENT IN AN ORGANIZED HEALTH CARE EDUCATION/TRAINING PROGRAM
Payer: COMMERCIAL

## 2018-06-13 DIAGNOSIS — Z12.39 SCREENING FOR BREAST CANCER: ICD-10-CM

## 2018-06-13 PROCEDURE — 77067 SCR MAMMO BI INCL CAD: CPT

## 2018-06-20 ENCOUNTER — HOSPITAL ENCOUNTER (OUTPATIENT)
Dept: RADIOLOGY | Facility: MEDICAL CENTER | Age: 58
End: 2018-06-20
Attending: STUDENT IN AN ORGANIZED HEALTH CARE EDUCATION/TRAINING PROGRAM
Payer: COMMERCIAL

## 2018-06-20 DIAGNOSIS — R92.8 ABNORMAL MAMMOGRAM OF LEFT BREAST: ICD-10-CM

## 2018-06-20 PROCEDURE — 76642 ULTRASOUND BREAST LIMITED: CPT | Mod: LT

## 2018-07-30 ENCOUNTER — OFFICE VISIT (OUTPATIENT)
Dept: INTERNAL MEDICINE | Facility: MEDICAL CENTER | Age: 58
End: 2018-07-30
Payer: COMMERCIAL

## 2018-07-30 VITALS
DIASTOLIC BLOOD PRESSURE: 106 MMHG | OXYGEN SATURATION: 95 % | HEIGHT: 63 IN | HEART RATE: 78 BPM | BODY MASS INDEX: 32.07 KG/M2 | WEIGHT: 181 LBS | TEMPERATURE: 98 F | SYSTOLIC BLOOD PRESSURE: 161 MMHG

## 2018-07-30 DIAGNOSIS — Z23 NEED FOR VACCINATION: ICD-10-CM

## 2018-07-30 DIAGNOSIS — Z72.0 TOBACCO USE: ICD-10-CM

## 2018-07-30 DIAGNOSIS — E78.5 DYSLIPIDEMIA: ICD-10-CM

## 2018-07-30 DIAGNOSIS — N60.02 CYST OF LEFT BREAST: ICD-10-CM

## 2018-07-30 DIAGNOSIS — I10 ESSENTIAL HYPERTENSION: ICD-10-CM

## 2018-07-30 PROCEDURE — 90471 IMMUNIZATION ADMIN: CPT | Performed by: INTERNAL MEDICINE

## 2018-07-30 PROCEDURE — 90636 HEP A/HEP B VACC ADULT IM: CPT | Performed by: INTERNAL MEDICINE

## 2018-07-30 PROCEDURE — 99214 OFFICE O/P EST MOD 30 MIN: CPT | Mod: GC,25 | Performed by: INTERNAL MEDICINE

## 2018-07-30 RX ORDER — CHLORTHALIDONE 25 MG/1
12.5 TABLET ORAL DAILY
Qty: 90 TAB | Refills: 0 | Status: SHIPPED | OUTPATIENT
Start: 2018-07-30 | End: 2018-10-25 | Stop reason: SDUPTHER

## 2018-07-30 NOTE — NON-PROVIDER
"Simi Massey is a 58 y.o. female here for a non-provider visit for:   TWINRIX (Hep A/Hep B) 2 of 2    Reason for immunization: Overdue/Provider Recommended  Immunization records indicate need for vaccine: Yes, confirmed with Epic  Minimum interval has been met for this vaccine: Yes  ABN completed: Not Indicated    Order and dose verified by: Cherri BARRERA Dated  7/20/16 was given to patient: Yes  All IAC Questionnaire questions were answered \"No.\"    Patient tolerated injection and no adverse effects were observed or reported: Yes    Pt scheduled for next dose in series: Not Indicated      "

## 2018-07-30 NOTE — PATIENT INSTRUCTIONS
NEW MEDICATION:    - START taking CHLORTHALIDONE 1/2 tab. In the morning. Take this medicine once per day.    - In three (3) weeks, please do a basic chemistry panel to check your electrolytes.

## 2018-07-30 NOTE — PROGRESS NOTES
Established Patient    Simi presents today with the following:    CC: Follow up for HTN.    HPI: Patient is a very pleasant 58-year-old female here to follow-up for hypertension. She is a very pleasant lasy and has been going through some major life stresses lately (her best friend passed away about 2 month ago, her best friend's mother passed about 1 month ago, and she now helping a close friend that is about to go through major surgery).    At last visit, we discussed the patient's elevated blood pressure and started lifestyle modifications. In the past month, the patient has made dietary changes and reduced her carbohydrate and sodium consumption. She has also been monitoring her BP at home and keeping a log. She forgot to bring the log in today, but reports that her BP runs around 130-140 systolic.     At today's visit, her pressure is significantly elevated, which the patient attributes to stress. She is currently asymptomatic and denies headaches, changes in vision, chest pain, dyspnea, nausea, lightheadedness.    Patient also continues to smoke - she is down to 6 cigarettes per day from 3/4 pack. Patient reports that she is having a hard time quitting due to the stress she is currently under, but she is still interested in quitting.      Patient Active Problem List    Diagnosis Date Noted   • Mood disorder (HCC) 01/18/2013   • Essential hypertension 10/12/2012   • Menopause 10/12/2012   • Sleep-disordered breathing 10/12/2012   • Dyslipidemia 09/07/2012   • Snoring 09/07/2012   • Tobacco use 09/07/2012       Current Outpatient Prescriptions   Medication Sig Dispense Refill   • Red Yeast Rice 600 MG Cap Take  by mouth.     • Cholecalciferol (VITAMIN D3) 5000 units Cap Take 1 Cap by mouth every day.     • B Complex Vitamins (VITAMIN B COMPLEX) CAPS Take 1 Cap by mouth every day.       • lipoic acid POWD Take 200 mg by mouth every day.       • LYSINE Take 1 Tab by mouth every day.       • Coenzyme Q10 (CO  Q 10) 10 MG CAPS Take 1 Cap by mouth every day. With red yeast rice      • Magnesium Hydroxide (MAGNESIA PO) Take 1 Tab by mouth every day.       • aspirin (ASA) 325 MG TABS Take 650 mg by mouth 2 times a day as needed.       No current facility-administered medications for this visit.        ROS: As per HPI. Additional pertinent symptoms as noted below.    All others negative    There were no vitals taken for this visit.    Physical Exam   Constitutional:  oriented to person, place, and time. No distress.   Eyes: Pupils are equal, round, and reactive to light. No scleral icterus.  Neck: Neck supple. No thyromegaly present.   Cardiovascular: Normal rate, regular rhythm and normal heart sounds.  Exam reveals no gallop and no friction rub.  No murmur heard.  Pulmonary/Chest: Breath sounds normal. Chest wall is not dull to percussion.   Abd: soft. NT. ND.  Musculoskeletal:   no edema.   Lymphadenopathy: no cervical adenopathy  Neurological: alert and oriented to person, place, and time.   Skin: No cyanosis. Nails show no clubbing.      Note: I have reviewed all pertinent labs and diagnostic tests associated with this visit with specific comments listed under the assessment and plan below    Assessment and Plan    1. Essential hypertension  Elevated BP at visit without evidence of end organ damage, likely worsened by stress and smoking. Patient started lifestyle adjustments and is keeping a home log.  - Start Chlorthalidone 12.5 mg daily  - counseled patient on possible side effects and dose adjustments that she should make  - continue to keep BP log to bring to next visit  - BMP in 2 weeks to check for electrolyte derangement    2. Dyslipidemia  CVD risk score 3%.  - Continue with red yeast rice    3. Tobacco use  Currently working on quitting smoking. Patient under a lot of stress.  - counseled patient on smoking cessation  - patient not currently interested in gum or patches  - LDCT to screen for lung cancer as the  patient has a 30 pack year smoking history.    4. Cyst of left breast  Discovered during mammography and confirmed with ultrasound.  - Follow up sono of breast in 6 months    5. Need for vaccination  - TwinRix second dose administered today  - last dose in December      Followup: Return in about 1 month (around 8/30/2018).      Signed by: Radha Garsia M.D.

## 2018-08-11 ENCOUNTER — TELEPHONE (OUTPATIENT)
Dept: HEMATOLOGY ONCOLOGY | Facility: MEDICAL CENTER | Age: 58
End: 2018-08-11

## 2018-08-11 NOTE — TELEPHONE ENCOUNTER
An order had been placed for a Chest CT, but per the provider notes they were interested in the Low Dose CT (LDCT)for the patient.  Called and spoke to Simi and she does meet the criteria, however she wanted to keep her CT appointment and not transition to the Lung Cancer Screening Program (LCSP).  Explained the benefits of the LCSP, such as annual testing, and that her copay would be higher with the chest CT than going through the LCSP for her LDCT.     She stated she understood but wanted to think about it.  Gave her our number and asked that she contact me on Monday or Tuesday when she has decided.

## 2018-08-15 ENCOUNTER — TELEPHONE (OUTPATIENT)
Dept: HEMATOLOGY ONCOLOGY | Facility: MEDICAL CENTER | Age: 58
End: 2018-08-15

## 2018-08-15 DIAGNOSIS — F17.210 CIGARETTE NICOTINE DEPENDENCE WITHOUT COMPLICATION: Primary | ICD-10-CM

## 2018-08-15 NOTE — TELEPHONE ENCOUNTER
Pt called back and would like to proceed with the LCSP.  Cancelled her Chest CT and scheduled her shared decision making appointment

## 2018-08-15 NOTE — TELEPHONE ENCOUNTER
Received referral to lung cancer screening program.  Chart review to assess for lung cancer screening program eligibility.   1. Age 55-77 yrs of age? Yes 58 y.o.  2. 30 pack year hx of smoking, or greater? Yes 1.4-1 ppdx 40 yrs=  35 pkyr hx  3. Current smoker or if quit, has pt quit within last 15 yrs?Yes  Current Smoker  4. Any signs or symptoms of lung cancer? None noted  5. Previous history of lung cancer? None noted  6. Chest CT within past 12 mos.? None noted  Patient does meet eligibility criteria. LCSP scheduling notified to schedule the shared decision making visit.

## 2018-08-23 ENCOUNTER — OFFICE VISIT (OUTPATIENT)
Dept: HEMATOLOGY ONCOLOGY | Facility: MEDICAL CENTER | Age: 58
End: 2018-08-23
Payer: COMMERCIAL

## 2018-08-23 VITALS
BODY MASS INDEX: 31.5 KG/M2 | HEIGHT: 63 IN | WEIGHT: 177.8 LBS | DIASTOLIC BLOOD PRESSURE: 94 MMHG | SYSTOLIC BLOOD PRESSURE: 150 MMHG | TEMPERATURE: 97.9 F | HEART RATE: 88 BPM | OXYGEN SATURATION: 97 % | RESPIRATION RATE: 18 BRPM

## 2018-08-23 DIAGNOSIS — F17.210 CIGARETTE SMOKER: ICD-10-CM

## 2018-08-23 PROCEDURE — G0296 VISIT TO DETERM LDCT ELIG: HCPCS | Performed by: NURSE PRACTITIONER

## 2018-08-23 ASSESSMENT — ENCOUNTER SYMPTOMS
COUGH: 1
SPUTUM PRODUCTION: 1
NERVOUS/ANXIOUS: 1
WHEEZING: 0
HEMOPTYSIS: 0
SHORTNESS OF BREATH: 1
BLURRED VISION: 0
WEIGHT LOSS: 0
HEADACHES: 0
DOUBLE VISION: 0

## 2018-08-23 ASSESSMENT — PAIN SCALES - GENERAL: PAINLEVEL: NO PAIN

## 2018-08-23 NOTE — PROGRESS NOTES
Subjective:      Simi Massey is a 58 y.o. female who presents for Lung Cancer Screening Program Prescreen (Nicotine Dependence) for lung cancer screening shared decision making visit.       HPI   Patient seen today for initial lung cancer screening visit. Patient referred by PCP, Dr. Radha Garsia.     The patient meets eligibility criteria including age, smoking history (30+ pack years), if former smoker, quit in the last 15 years, and absence of signs or symptoms of lung cancer.    - Age - 58  - Smoking history - Patient has smoked for 40 years at an average of 1.5 ppd = 60 pack year smoking history.  - Current smoking status - current smoker, ~10 ciagarettes daily now  - No symptoms of lung cancer and no previous history of lung cancer       Allergies   Allergen Reactions   • Nkda [No Known Drug Allergy]        Current Outpatient Prescriptions on File Prior to Visit   Medication Sig Dispense Refill   • chlorthalidone (HYGROTON) 25 MG Tab Take 0.5 Tabs by mouth every day. 90 Tab 0   • Red Yeast Rice 600 MG Cap Take  by mouth.     • Cholecalciferol (VITAMIN D3) 5000 units Cap Take 1 Cap by mouth every day.     • B Complex Vitamins (VITAMIN B COMPLEX) CAPS Take 1 Cap by mouth every day.       • lipoic acid POWD Take 200 mg by mouth every day.       • LYSINE Take 1 Tab by mouth every day.       • Coenzyme Q10 (CO Q 10) 10 MG CAPS Take 1 Cap by mouth every day. With red yeast rice      • Magnesium Hydroxide (MAGNESIA PO) Take 1 Tab by mouth every day.       • aspirin (ASA) 325 MG TABS Take 650 mg by mouth 2 times a day as needed.       No current facility-administered medications on file prior to visit.          Review of Systems   Constitutional: Positive for malaise/fatigue ('always tired' - not very energetic). Negative for weight loss.   Eyes: Negative for blurred vision and double vision.   Respiratory: Positive for cough (intermittent - worse with recent poor air quality), sputum production  "(clear/white/greenish at times) and shortness of breath (with exertion). Negative for hemoptysis and wheezing.    Cardiovascular:        HTN despite meds   Neurological: Negative for headaches.   Psychiatric/Behavioral: The patient is nervous/anxious (life stressors this year; teary today).           Objective:     /94   Pulse 88   Temp 36.6 °C (97.9 °F)   Resp 18   Ht 1.6 m (5' 2.99\")   Wt 80.7 kg (177 lb 12.8 oz)   SpO2 97%   Breastfeeding? No   BMI 31.50 kg/m²      Physical Exam   Constitutional: She is oriented to person, place, and time. She appears well-developed and well-nourished. No distress.   Cardiovascular: Normal rate, regular rhythm and normal heart sounds.  Exam reveals no gallop and no friction rub.    No murmur heard.  HTN   Pulmonary/Chest: Effort normal and breath sounds normal. No respiratory distress. She has no wheezes.   Musculoskeletal: Normal range of motion.   Neurological: She is alert and oriented to person, place, and time.   Skin: Skin is warm and dry. She is not diaphoretic.   Psychiatric:   Teary about life stresses, encouraged to follow up with PCP, not amenable to counseling suggestion at time of visit   Vitals reviewed.         Assessment/Plan:     1. Cigarette smoker  CT-LUNG CANCER-SCREENING       We conducted a shared decision-making process using a decision aid. We reviewed benefits and harms of screening, including false positives and potential need for additional diagnostic testing, the possibility of over diagnosis, and total radiation exposure.    We discussed the importance of adhering to annual LDCT screening. We also discussed the impact of comorbities on the patient's the ability or willingness to undergo diagnostic procedure(s) and treatment.    Counseling on the importance of maintaining cigarette smoking abstinence if former smoker; or the importance of smoking cessation if current smoker and, if appropriate, furnishing of information about tobacco " cessation interventions. I provided patient with smoking cessation materials and resources [facility] and the community. Patient appreciative of the resources.     Based on our discussion, we have decided to begin annual lung cancer screening starting now.

## 2018-09-17 ENCOUNTER — TELEPHONE (OUTPATIENT)
Dept: HEMATOLOGY ONCOLOGY | Facility: MEDICAL CENTER | Age: 58
End: 2018-09-17

## 2018-09-17 ENCOUNTER — HOSPITAL ENCOUNTER (OUTPATIENT)
Dept: RADIOLOGY | Facility: MEDICAL CENTER | Age: 58
End: 2018-09-17
Attending: NURSE PRACTITIONER
Payer: COMMERCIAL

## 2018-09-17 DIAGNOSIS — R91.8 ABNORMAL CT LUNG SCREENING: ICD-10-CM

## 2018-09-17 DIAGNOSIS — F17.210 CIGARETTE SMOKER: ICD-10-CM

## 2018-09-17 PROCEDURE — G0297 LDCT FOR LUNG CA SCREEN: HCPCS

## 2018-09-17 NOTE — PROGRESS NOTES
Phoned patient and updated with results of LDCT exam performed 9/17/18.  Notified that the results showed multiple small nodules, and a referral to Pulmonary Nodule Clinic would be completed in accordance with the discussion at the Lung Cancer Screening Visit.    To monitor for nodule changes, a follow-up low-dose LDCT is recommended in approximately 6 months, sooner as determined per [facility] Provider.     Patient agrees to all recommendations. Referring provider, Radha Garsia M.D. notified of results, incidental findings, and referral to [facility].    [facility] updated and patient sent lung cancer screening result letter.

## 2018-09-17 NOTE — TELEPHONE ENCOUNTER
Phoned patient with results of LDCT exam performed 9/17/2018.  Notified her that the results showed very small  nodules.   To monitor for change we recommend a follow-up low-dose chest CT in 6 months and a referral to our Pulmonary Medicine group for evaluation.  Patient agrees to all recommendations. Referring provider Dr. Radha Garsia notified of results and incidental findings.  Health maintenance updated and patient sent lung cancer screening result letter.

## 2018-09-17 NOTE — LETTER
[address]  [place] [address]  P [phone]  F [phone]         Date: September 17, 2018    [place] [address]    Re:  Low-dose chest CT performed on 9/17/2018    Medical Record Number: [id number]    Dear Simi,    We are writing to let you know that the results of your recent low-dose chest CT (LDCT) examination shows one or more lung nodule(s) which are likely benign (not cancer).  Lung nodules are very common and many people without cancer have these nodules.  To make sure these nodule(s) are benign, and remain unchanged, your radiologist recommends you have another low-dose chest CT on or around 3/17/2018. In the event that any additional “incidental” findings were identified from this exam, we have communicated back to your primary care provider for follow-up.    Here are some other important points you should know:  • Your low-dose Chest CT report has been sent to your referring or primary health care provider and is available to participants in [facility].  As a part of our Lung Cancer Screening program we will remind you and your referring health care provider when your next LDCT screening is due.  • Although low-dose chest CT is very effective at finding lung cancer early, it cannot find all lung cancers. If you develop any new symptoms such as shortness of breath, chest pain, or coughing up blood, please call your doctor.  • Please keep in mind that good health involves quitting smoking (for help, call [facility] Quit Tobacco program at [phone]), an annual physical exam, and continued screening with low-dose chest CT.    Thank you for participating in the Lung Cancer Screening program.  If you have any questions about this letter or our program, please call our Nurse Navigator at [phone].    Sincerely,  Emily Camejo MD, [facility]  Medical Director [facility] Lung Cancer Screening Program

## 2018-09-25 ENCOUNTER — OFFICE VISIT (OUTPATIENT)
Dept: PULMONOLOGY | Facility: HOSPICE | Age: 58
End: 2018-09-25
Payer: COMMERCIAL

## 2018-09-25 VITALS
RESPIRATION RATE: 20 BRPM | DIASTOLIC BLOOD PRESSURE: 82 MMHG | WEIGHT: 177.8 LBS | BODY MASS INDEX: 31.5 KG/M2 | HEIGHT: 63 IN | HEART RATE: 92 BPM | SYSTOLIC BLOOD PRESSURE: 126 MMHG | OXYGEN SATURATION: 97 % | TEMPERATURE: 97.7 F

## 2018-09-25 DIAGNOSIS — Z72.0 TOBACCO ABUSE: ICD-10-CM

## 2018-09-25 DIAGNOSIS — R91.8 PULMONARY NODULES: ICD-10-CM

## 2018-09-25 PROCEDURE — 99214 OFFICE O/P EST MOD 30 MIN: CPT | Performed by: INTERNAL MEDICINE

## 2018-09-25 NOTE — PROGRESS NOTES
Chief Complaint   Patient presents with   • Establish Care     Referred by MERY Cano for Abnormal CT.       HPI: This patient is a 58 y.o. Female who is referred to lung nodule clinic from Lung Cancer Screening program.  The patient is an active smoker with estimated 60 pack years to date.  She has been cutting back on her smoking significantly and is interested in quitting.  She denies shortness of breath, chest pain, hemoptysis or weight loss.  She has clear sputum.  She feels that her normal baseline.  She denies a history of pneumonia or tuberculosis.  Low-dose lung cancer screening chest CAT scan on September 17, 2018 was reviewed and shows numerous, <6mm pulmonary nodules scattered bilaterally in addition to mild emphysema.      Past Medical History:   Diagnosis Date   • Anxiety    • Back pain    • Chickenpox    • Depression    • Armenian measles    • Head ache    • Hypertension    • Influenza    • Pulmonary nodule    • Shingles        Social History     Social History   • Marital status:      Spouse name: N/A   • Number of children: N/A   • Years of education: N/A     Occupational History   • Not on file.     Social History Main Topics   • Smoking status: Current Every Day Smoker     Packs/day: 1.50     Years: 40.00     Types: Cigarettes   • Smokeless tobacco: Never Used      Comment: 15y @ 3ppd, quit x 2, then 1ppd (avd 1.5 ppd)   • Alcohol use Yes      Comment: once or twice a year    • Drug use: No   • Sexual activity: Yes     Partners: Male     Other Topics Concern   • Not on file     Social History Narrative   • No narrative on file       Family History   Problem Relation Age of Onset   • Lung Disease Mother    • Cancer Maternal Grandmother         Lung   • Breast Cancer Daughter        Current Outpatient Prescriptions on File Prior to Visit   Medication Sig Dispense Refill   • chlorthalidone (HYGROTON) 25 MG Tab Take 0.5 Tabs by mouth every day. 90 Tab 0   • Red Yeast Rice 600 MG Cap Take  " by mouth.     • Cholecalciferol (VITAMIN D3) 5000 units Cap Take 1 Cap by mouth every day.     • B Complex Vitamins (VITAMIN B COMPLEX) CAPS Take 1 Cap by mouth every day.       • lipoic acid POWD Take 200 mg by mouth every day.       • LYSINE Take 1 Tab by mouth every day.       • Coenzyme Q10 (CO Q 10) 10 MG CAPS Take 1 Cap by mouth every day. With red yeast rice      • Magnesium Hydroxide (MAGNESIA PO) Take 1 Tab by mouth every day.       • aspirin (ASA) 325 MG TABS Take 650 mg by mouth 2 times a day as needed.       No current facility-administered medications on file prior to visit.        Allergies: Nkda [no known drug allergy]    ROS:   Constitutional: Denies fevers, chills, night sweats, fatigue or weight loss  Eyes: Denies pain, drainage, double vision  Ears, Nose, Throat: Denies earache, difficulty hearing, tinnitus, nasal congestion, hoarseness  Cardiovascular: Denies chest pain, tightness, palpitations, orthopnea or edema  Respiratory: Denies shortness of breath, cough, wheezing, hemoptysis  Sleep: Denies daytime sleepiness, +snoring, denies apneas, insomnia, morning headaches  GI: Denies heartburn, dysphagia, nausea, abdominal pain, diarrhea or constipation  : Denies frequent urination, hematuria, discharge or painful urination  Musculoskeletal: Denies back pain, +painful joints, denies sore muscles  Neurological: Denies weakness or headaches  Skin: No rashes    Blood pressure 126/82, pulse 92, temperature 36.5 °C (97.7 °F), temperature source Temporal, resp. rate 20, height 1.6 m (5' 3\"), weight 80.6 kg (177 lb 12.8 oz), SpO2 97 %, not currently breastfeeding.    Physical Exam:  Appearance: Well-nourished, well-developed, in no acute distress  HEENT: Normocephalic, atraumatic, white sclera, PERRLA, oropharynx clear  Neck: No adenopathy or masses  Respiratory: no intercostal retractions or accessory muscle use  Lungs auscultation: Clear to auscultation bilaterally  Cardiovascular: Regular rate " rhythm. No murmurs, rubs or gallops.  No LE edema  Abdomen: soft, nondistended  Gait: Normal  Digits: No clubbing, cyanosis  Motor: No focal deficits  Orientation: Oriented to time, person and place    Diagnosis:  1. Pulmonary nodules  CT-CHEST (THORAX) W/O   2. Tobacco abuse         Plan:  The patient had a low-dose lung cancer screening chest CAT scan showing numerous, scattered pulmonary micronodules.  She is a lifelong smoker.  The nodules are likely postinflammatory; they are too small for biopsy or PET resolution.   Smoking cessation counseling provided-she has been successful cutting back with nicotine lozenges which was encouraged and appears motivated to quit smoking.  Recommend chest CAT scan without contrast in 6 months for follow-up of nodules.  She was relieved as her daughter was just diagnosed with breast cancer and understandably wants to focus on her in the coming months.  Return in about 6 months (around 3/25/2019) for at lung nodule clinic.

## 2018-10-25 ENCOUNTER — OFFICE VISIT (OUTPATIENT)
Dept: INTERNAL MEDICINE | Facility: MEDICAL CENTER | Age: 58
End: 2018-10-25
Payer: COMMERCIAL

## 2018-10-25 VITALS
OXYGEN SATURATION: 96 % | DIASTOLIC BLOOD PRESSURE: 105 MMHG | TEMPERATURE: 98.6 F | SYSTOLIC BLOOD PRESSURE: 160 MMHG | HEIGHT: 63 IN | BODY MASS INDEX: 31.36 KG/M2 | WEIGHT: 177 LBS | HEART RATE: 101 BPM

## 2018-10-25 DIAGNOSIS — G89.29 CHRONIC RIGHT SHOULDER PAIN: ICD-10-CM

## 2018-10-25 DIAGNOSIS — M79.652 PAIN OF LEFT THIGH: ICD-10-CM

## 2018-10-25 DIAGNOSIS — I10 ESSENTIAL HYPERTENSION: ICD-10-CM

## 2018-10-25 DIAGNOSIS — M25.511 CHRONIC RIGHT SHOULDER PAIN: ICD-10-CM

## 2018-10-25 PROCEDURE — 99214 OFFICE O/P EST MOD 30 MIN: CPT | Mod: GC | Performed by: INTERNAL MEDICINE

## 2018-10-25 RX ORDER — CHLORTHALIDONE 25 MG/1
12.5 TABLET ORAL DAILY
Qty: 90 TAB | Refills: 1 | Status: SHIPPED | OUTPATIENT
Start: 2018-10-25 | End: 2018-12-13 | Stop reason: SDUPTHER

## 2018-10-25 NOTE — PATIENT INSTRUCTIONS
Iliotibial Band Syndrome  Iliotibial band syndrome is pain in the outer, lower thigh. The pain is caused by an inflammation of the iliotibial band. This is a band of thick fibrous tissue that runs down the outside of the thigh. The iliotibial band begins at the hip. It extends to the outer side of the shin bone (tibia) just below the knee joint. The band works with the thigh muscles. Together they provide stability to the outside of the knee joint.  Iliotibial band syndrome occurs when there is inflammation to this band of tissue. This is typically due to over use and not due to an injury. The irritation usually occurs over the outside of the knee joint, at the the end of the thigh bone (femur). The iliotibial band crosses bone and muscle at this point. Between these structures is a cushioning sac (bursa). The bursa should make possible a smooth gliding motion. However, when inflamed, the iliotibial band does not glide easily. When inflamed, there is pain with motion of the knee. Usually the pain worsens with continued movement and the pain goes away with rest.  This problem usually arises when there is a sudden increase in sports activities involving your legs. Running, and playing soccer or basketball are examples of activities causing this. Others who are prone to iliotibial band syndrome include individuals with mechanical problems such as leg length differences, abnormality of walking, bowed legs etc.  HOME CARE INSTRUCTIONS   · Apply ice to the injured area:  ¨ Put ice in a plastic bag.  ¨ Place a towel between your skin and the bag.  ¨ Leave the ice on for 20 minutes, 2-3 times a day.  · Limit excessive training or eliminate training until pain goes away.  · While pain is present, you may use gentle range of motion. Do not resume regular use until instructed by your health care provider. Begin use gradually. Do not increase activity to the point of pain. If pain does develop, decrease activity and continue  the above measures. Gradually increase activities that do not cause discomfort. Do this until you finally achieve normal use.  · Perform low-impact activities while pain is present. Wear proper footwear.  · Only take over-the-counter or prescription medicines for pain, discomfort, or fever as directed by your health care provider.  SEEK MEDICAL CARE IF:   · Your pain increases or pain is not controlled with medications.  · You develop new, unexplained symptoms, or an increase of the symptoms that brought you to your health care provider.  · Your pain and symptoms are not improving or are getting worse.  This information is not intended to replace advice given to you by your health care provider. Make sure you discuss any questions you have with your health care provider.  Document Released: 06/09/2003 Document Revised: 01/08/2016 Document Reviewed: 07/17/2014  Powered by [employer] Interactive Patient Education © 2017 Powered by [employer]    Iliotibial Band Syndrome Rehab  Ask your health care provider which exercises are safe for you. Do exercises exactly as told by your health care provider and adjust them as directed. It is normal to feel mild stretching, pulling, tightness, or discomfort as you do these exercises, but you should stop right away if you feel sudden pain or your pain gets worse. Do not begin these exercises until told by your health care provider.  Stretching and range of motion exercises  These exercises warm up your muscles and joints and improve the movement and flexibility of your hip and pelvis.  Exercise A: Quadriceps, prone  1. Lie on your abdomen on a firm surface, such as a bed or padded floor.  2. Bend your left / right knee and hold your ankle. If you cannot reach your ankle or pant leg, loop a belt around your foot and grab the belt instead.  3. Gently pull your heel toward your buttocks. Your knee should not slide out to the side. You should feel a stretch in the front of your thigh and knee.  4. Hold  this position for __________ seconds.  Repeat __________ times. Complete this stretch __________ times a day.  Exercise B: Iliotibial band  1. Lie on your side with your left / right leg in the top position.  2. Bend both of your knees and grab your left / right ankle. Stretch out your bottom arm to help you balance.  3. Slowly bring your top knee back so your thigh goes behind your trunk.  4. Slowly lower your top leg toward the floor until you feel a gentle stretch on the outside of your left / right hip and thigh. If you do not feel a stretch and your knee will not fall farther, place the heel of your other foot on top of your knee and pull your knee down toward the floor with your foot.  5. Hold this position for __________ seconds.  Repeat __________ times. Complete this stretch __________ times a day.  Strengthening exercises  These exercises build strength and endurance in your hip and pelvis. Endurance is the ability to use your muscles for a long time, even after they get tired.  Exercise C: Straight leg raises (hip abductors)  1. Lie on your side with your left / right leg in the top position. Lie so your head, shoulder, knee, and hip line up. You may bend your bottom knee to help you balance.  2. Roll your hips slightly forward so your hips are stacked directly over each other and your left / right knee is facing forward.  3. Tense the muscles in your outer thigh and lift your top leg 4-6 inches (10-15 cm).  4. Hold this position for __________ seconds.  5. Slowly return to the starting position. Let your muscles relax completely before doing another repetition.  Repeat __________ times. Complete this exercise __________ times a day.  Exercise D: Straight leg raises (hip extensors)  1. Lie on your abdomen on your bed or a firm surface. You can put a pillow under your hips if that is more comfortable.  2. Bend your left / right knee so your foot is straight up in the air.  3. Squeeze your buttock muscles  and lift your left / right thigh off the bed. Do not let your back arch.  4. Tense this muscle as hard as you can without increasing any knee pain.  5. Hold this position for __________ seconds.  6. Slowly lower your leg to the starting position and allow it to relax completely.  Repeat __________ times. Complete this exercise __________ times a day.  Exercise E: Hip hike  1. Stand sideways on a bottom step. Stand on your left / right leg with your other foot unsupported next to the step. You can hold onto the railing or wall if needed for balance.  2. Keep your knees straight and your torso square. Then, lift your left / right hip up toward the ceiling.  3. Slowly let your left / right hip lower toward the floor, past the starting position. Your foot should get closer to the floor. Do not lean or bend your knees.  Repeat __________ times. Complete this exercise __________ times a day.  This information is not intended to replace advice given to you by your health care provider. Make sure you discuss any questions you have with your health care provider.  Document Released: 12/18/2006 Document Revised: 08/22/2017 Document Reviewed: 11/18/2016  [employer]vier Interactive Patient Education © 2017 [employer]

## 2018-10-26 NOTE — PROGRESS NOTES
Established Patient    Simi presents today with the following:    CC: Shoulder pain.  Thigh pain.  Hypertension.    HPI: Patient is a very pleasant 58-year-old female here to follow-up on hypertension as well as with acute complaint of shoulder and thigh pain.  Patient has had a difficult year and has had multiple family members die, get sick, or undergo extensive surgical procedures.  Patient is currently under a lot of stress.    Hypertension  Patient with elevated blood pressure during this visit despite compliance with her medication.  Due to her family situation, the patient has been under tremendous amount of stress both physical and emotional.  Patient denies headaches, lightheadedness, dizziness, chest pain, dyspnea.    Shoulder pain  Patient has had shoulder pain on and off for the past year.  Recently she reports that she is been experiencing shoulder discomfort more frequently than in the past.  She has been using over-the-counter pain management such as Tylenol, Advil, naproxen, as well as sports creams and patches.  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  Patient reports that her symptoms are exacerbated with physical activity as well as with certain positions, and relieved with rest and medication.  She is never done physical therapy.    Thigh pain  Patient reports occasional muscular pain in her left thigh.  Patient states that she found a fibrous lesion along the lateral thigh which she feels it compresses something inside that makes her muscle hurt afterwards.  She never had this formally evaluated.  Patient still has full range of motion and is still able to do all of her ADLs.    Patient Active Problem List    Diagnosis Date Noted   • Cyst of left breast 07/30/2018   • Mood disorder (HCC) 01/18/2013   • Essential hypertension 10/12/2012   • Menopause 10/12/2012   •  "Sleep-disordered breathing 10/12/2012   • Dyslipidemia 09/07/2012   • Snoring 09/07/2012   • Tobacco use 09/07/2012       Current Outpatient Prescriptions   Medication Sig Dispense Refill   • chlorthalidone (HYGROTON) 25 MG Tab Take 0.5 Tabs by mouth every day. 90 Tab 1   • B Complex Vitamins (VITAMIN B COMPLEX) CAPS Take 1 Cap by mouth every day.       • aspirin (ASA) 325 MG TABS Take 650 mg by mouth 2 times a day as needed.       No current facility-administered medications for this visit.        ROS: As per HPI. Additional pertinent symptoms as noted below.    All other systems reviewed and are negative.    /105 (BP Location: Left arm)   Pulse (!) 101   Temp 37 °C (98.6 °F) (Temporal)   Ht 1.6 m (5' 3\")   Wt 80.3 kg (177 lb)   LMP  (LMP Unknown)   SpO2 96%   BMI 31.35 kg/m²     Physical Exam   Constitutional:  oriented to person, place, and time. No distress.   Eyes: Pupils are equal, round, and reactive to light. No scleral icterus.  Neck: Neck supple. No thyromegaly present.   Cardiovascular: Normal rate, regular rhythm and normal heart sounds.  Exam reveals no gallop and no friction rub.  No murmur heard.  Pulmonary/Chest: Breath sounds normal. Chest wall is not dull to percussion.   Musculoskeletal:   no edema.   Lymphadenopathy: no cervical adenopathy  Neurological: alert and oriented to person, place, and time.   Skin: No cyanosis. Nails show no clubbing.  Other: Full range of motion in the bilateral shoulders.  No tenderness to palpation.  Crepitus on passive s of range of motion in the right shoulder.  Fibrous quarter sized flat disc on the left lateral thigh just above the knee; mobile, nontender to palpation.      Note: I have reviewed all pertinent labs and diagnostic tests associated with this visit with specific comments listed under the assessment and plan below    Assessment and Plan    1. Essential hypertension  Generally well controlled with chlorthalidone.  Patient has been under " much more stress recently which can attribute for the elevation in her blood pressure.  -Continue chlorthalidone 12.5 mg daily for now  -Patient advised to keep a blood pressure log over the next week and send it over to me, I will make adjustments to her medication based on the blood pressure log.    2. Chronic right shoulder pain  Patient has had right shoulder pain over the course of the past year.  Has not had it formally evaluated and has not had physical therapy.  -Patient advised that for most [employer] treatment, she can visit renal orthopedic urgent care  -Referral to physical therapy provided  -Referral to orthopedics provided   -In the meantime, the patient has been advised to continue topical over-the-counter medications to control her shoulder pain.    3. Pain of left thigh  Patient musculoskeletal pain in her right thigh not associated with exertion.  Has a small fibrous disks on the lateral thigh that has never been evaluated.  -Ultrasound of soft tissues of left thigh      Followup: Return in about 7 weeks (around 12/10/2018), or with me, for long, for Long.      Signed by: Radha Garsia M.D.

## 2018-11-26 ENCOUNTER — HOSPITAL ENCOUNTER (OUTPATIENT)
Dept: RADIOLOGY | Facility: MEDICAL CENTER | Age: 58
End: 2018-11-26
Attending: STUDENT IN AN ORGANIZED HEALTH CARE EDUCATION/TRAINING PROGRAM
Payer: COMMERCIAL

## 2018-11-26 ENCOUNTER — NON-PROVIDER VISIT (OUTPATIENT)
Dept: HEMATOLOGY ONCOLOGY | Facility: MEDICAL CENTER | Age: 58
End: 2018-11-26
Payer: COMMERCIAL

## 2018-11-26 VITALS
SYSTOLIC BLOOD PRESSURE: 150 MMHG | RESPIRATION RATE: 16 BRPM | HEART RATE: 102 BPM | OXYGEN SATURATION: 99 % | DIASTOLIC BLOOD PRESSURE: 110 MMHG | WEIGHT: 174.05 LBS | TEMPERATURE: 98.1 F | BODY MASS INDEX: 30.84 KG/M2 | HEIGHT: 63 IN

## 2018-11-26 DIAGNOSIS — G89.29 CHRONIC RIGHT SHOULDER PAIN: ICD-10-CM

## 2018-11-26 DIAGNOSIS — M25.511 CHRONIC RIGHT SHOULDER PAIN: ICD-10-CM

## 2018-11-26 DIAGNOSIS — Z84.81 FHX: BRCA2 GENE POSITIVE: ICD-10-CM

## 2018-11-26 DIAGNOSIS — M79.652 PAIN OF LEFT THIGH: ICD-10-CM

## 2018-11-26 PROCEDURE — 73030 X-RAY EXAM OF SHOULDER: CPT | Mod: RT

## 2018-11-26 PROCEDURE — 76881 US COMPL JOINT R-T W/IMG: CPT

## 2018-11-26 PROCEDURE — 36415 COLL VENOUS BLD VENIPUNCTURE: CPT | Performed by: MEDICAL GENETICS

## 2018-11-26 ASSESSMENT — PAIN SCALES - GENERAL: PAINLEVEL: NO PAIN

## 2018-11-26 NOTE — NON-PROVIDER
Simi Massey is a 58 y.o. female here for a non-provider visit for: Lab Draws  on 11/26/2018 at 1:11 PM    Procedure Performed: Venipuncture     Anatomical site: Left Antecubital Area (AC)    Equipment used: 21g Butterfly     Labs drawn: Amb[place]    Ordering Provider: Dr. Karthikeyan Hoffman By: Yolanda Casey, Med Ass't    Without incident.

## 2018-12-06 ENCOUNTER — TELEPHONE (OUTPATIENT)
Dept: HEMATOLOGY ONCOLOGY | Facility: MEDICAL CENTER | Age: 58
End: 2018-12-06

## 2018-12-07 NOTE — TELEPHONE ENCOUNTER
Patient returned your call regarding her genetic testing done 11/26/2018. Please call her at [phone].

## 2018-12-13 ENCOUNTER — OFFICE VISIT (OUTPATIENT)
Dept: INTERNAL MEDICINE | Facility: MEDICAL CENTER | Age: 58
End: 2018-12-13
Payer: COMMERCIAL

## 2018-12-13 VITALS — OXYGEN SATURATION: 97 % | DIASTOLIC BLOOD PRESSURE: 92 MMHG | HEART RATE: 88 BPM | SYSTOLIC BLOOD PRESSURE: 162 MMHG

## 2018-12-13 DIAGNOSIS — Z72.0 TOBACCO USE: ICD-10-CM

## 2018-12-13 DIAGNOSIS — I10 ESSENTIAL HYPERTENSION: ICD-10-CM

## 2018-12-13 DIAGNOSIS — N60.02 CYST OF LEFT BREAST: ICD-10-CM

## 2018-12-13 PROBLEM — Z80.3 FAMILY HISTORY OF BREAST CANCER: Status: ACTIVE | Noted: 2018-12-13

## 2018-12-13 PROBLEM — N63.0 BREAST MASS: Status: ACTIVE | Noted: 2018-12-13

## 2018-12-13 PROCEDURE — 99214 OFFICE O/P EST MOD 30 MIN: CPT | Mod: GC | Performed by: INTERNAL MEDICINE

## 2018-12-13 RX ORDER — CHLORTHALIDONE 25 MG/1
12.5 TABLET ORAL DAILY
Qty: 90 TAB | Refills: 1 | Status: SHIPPED | OUTPATIENT
Start: 2018-12-13 | End: 2019-02-26 | Stop reason: SDUPTHER

## 2018-12-13 NOTE — ASSESSMENT & PLAN NOTE
Patient was found to have a breast cyst on mammogram since months ago and is due for a follow-up mammogram to check for interval change.  -Mammogram ordered.

## 2018-12-13 NOTE — ASSESSMENT & PLAN NOTE
Patient is asymptomatic, but blood pressure is not at goal despite compliance with medication.  -Increase chlorthalidone to 25 mg daily  -Encouraged patient to keep track of her blood pressure daily with a blood pressure log

## 2018-12-13 NOTE — ASSESSMENT & PLAN NOTE
Patient is a current daily smoker, not able to quit due to stressful life..  -Counseled patient on smoking cessation.  We will work on smoking cessation after the new year.

## 2018-12-13 NOTE — PROGRESS NOTES
Established Patient    Simi presents today with the following:    CC: Follow-up for hypertension.  Follow-up for shoulder pain.    HPI: Patient is a very pleasant 58-year-old female with hypertension here to follow-up for chronic health issues.  Patient has been on chlorthalidone 12.5 mg daily and has been compliant with her medication without any associated side effects.  Despite compliance, the patient is not yet at goal for blood pressure control.  She is currently asymptomatic.    Patient also has ongoing shoulder pain of the right shoulder.  Patient reports the pain is worse at the end of the day after she is done with work, relieved by Sportscreme as well as nonsteroidals and Tylenol, worse with physical activity.  X-ray done was completely normal.  Patient denies any trauma, but does endorse some cervical issues in the past.  Patient denies weakness of the extremity, numbness, tingling, changes in sensation.  Has not yet been to see an orthopedic specialist due to time constraints.    She is currently undergoing a very stressful period in her life, but seems to be coping well.  Not currently able to get counseling due to time constraints.    Patient Active Problem List    Diagnosis Date Noted   • Family history of breast cancer 12/13/2018   • Cyst of left breast 07/30/2018   • Mood disorder (HCC) 01/18/2013   • Essential hypertension 10/12/2012   • Menopause 10/12/2012   • Sleep-disordered breathing 10/12/2012   • Dyslipidemia 09/07/2012   • Snoring 09/07/2012   • Tobacco use 09/07/2012       Current Outpatient Prescriptions   Medication Sig Dispense Refill   • chlorthalidone (HYGROTON) 25 MG Tab Take 0.5 Tabs by mouth every day. 90 Tab 1   • B Complex Vitamins (VITAMIN B COMPLEX) CAPS Take 1 Cap by mouth every day.       • aspirin (ASA) 325 MG TABS Take 650 mg by mouth 2 times a day as needed.       No current facility-administered medications for this visit.        ROS: As per HPI. Additional  pertinent systems as noted below.    All other systems reviewed and are negative.    BP (!) 162/92 (BP Location: Right arm, Patient Position: Sitting, BP Cuff Size: Adult)   Pulse 88   LMP  (LMP Unknown)   SpO2 97%     Physical Exam   Constitutional:  oriented to person, place, and time. No distress.   Eyes: Pupils are equal, round, and reactive to light. No scleral icterus.  Neck: Neck supple. No thyromegaly present.   Cardiovascular: Normal rate, regular rhythm and normal heart sounds.  Exam reveals no gallop and no friction rub.  No murmur heard.  Pulmonary/Chest: Breath sounds normal. Chest wall is not dull to percussion.   Musculoskeletal:   no edema.   Lymphadenopathy: no cervical adenopathy  Neurological: alert and oriented to person, place, and time.   Skin: No cyanosis. Nails show no clubbing.    Note: I have reviewed all pertinent labs and diagnostic tests associated with this visit with specific comments listed under the assessment and plan below    Assessment and Plan    Essential hypertension  Patient is asymptomatic, but blood pressure is not at goal despite compliance with medication.  -Increase chlorthalidone to 25 mg daily  -Encouraged patient to keep track of her blood pressure daily with a blood pressure log    Cyst of left breast  Patient was found to have a breast cyst on mammogram since months ago and is due for a follow-up mammogram to check for interval change.  -Mammogram ordered.    Tobacco use  Patient is a current daily smoker, not able to quit due to stressful life..  -Counseled patient on smoking cessation.  We will work on smoking cessation after the new year.        Followup: Return in about 3 months (around 3/13/2019), or please make for long., for Long.      Signed by: Radha Garsia M.D.

## 2018-12-14 ENCOUNTER — TELEPHONE (OUTPATIENT)
Dept: HEMATOLOGY ONCOLOGY | Facility: MEDICAL CENTER | Age: 58
End: 2018-12-14

## 2018-12-14 NOTE — TELEPHONE ENCOUNTER
Patient called in regards to her son for the genetics testing. Patient states you had instructions on how to go about the testing.

## 2019-03-05 ENCOUNTER — HOSPITAL ENCOUNTER (OUTPATIENT)
Dept: RADIOLOGY | Facility: MEDICAL CENTER | Age: 59
End: 2019-03-05
Attending: STUDENT IN AN ORGANIZED HEALTH CARE EDUCATION/TRAINING PROGRAM
Payer: COMMERCIAL

## 2019-03-05 DIAGNOSIS — N60.02 CYST OF LEFT BREAST: ICD-10-CM

## 2019-03-05 PROCEDURE — 76642 ULTRASOUND BREAST LIMITED: CPT | Mod: LT

## 2019-03-08 ENCOUNTER — OFFICE VISIT (OUTPATIENT)
Dept: URGENT CARE | Facility: PHYSICIAN GROUP | Age: 59
End: 2019-03-08
Payer: COMMERCIAL

## 2019-03-08 ENCOUNTER — HOSPITAL ENCOUNTER (OUTPATIENT)
Dept: RADIOLOGY | Facility: MEDICAL CENTER | Age: 59
End: 2019-03-08
Attending: INTERNAL MEDICINE
Payer: COMMERCIAL

## 2019-03-08 VITALS
DIASTOLIC BLOOD PRESSURE: 68 MMHG | TEMPERATURE: 99.1 F | HEART RATE: 105 BPM | HEIGHT: 63 IN | BODY MASS INDEX: 30.83 KG/M2 | WEIGHT: 174 LBS | RESPIRATION RATE: 15 BRPM | SYSTOLIC BLOOD PRESSURE: 118 MMHG | OXYGEN SATURATION: 95 %

## 2019-03-08 DIAGNOSIS — R91.8 PULMONARY NODULES: ICD-10-CM

## 2019-03-08 DIAGNOSIS — J10.1 INFLUENZA B: ICD-10-CM

## 2019-03-08 LAB
FLUAV+FLUBV AG SPEC QL IA: NORMAL
INT CON NEG: NEGATIVE
INT CON POS: POSITIVE

## 2019-03-08 PROCEDURE — 87804 INFLUENZA ASSAY W/OPTIC: CPT | Performed by: NURSE PRACTITIONER

## 2019-03-08 PROCEDURE — 99214 OFFICE O/P EST MOD 30 MIN: CPT | Performed by: NURSE PRACTITIONER

## 2019-03-08 PROCEDURE — 71250 CT THORAX DX C-: CPT

## 2019-03-08 RX ORDER — OSELTAMIVIR PHOSPHATE 75 MG/1
75 CAPSULE ORAL 2 TIMES DAILY
Qty: 10 CAP | Refills: 0 | Status: SHIPPED | OUTPATIENT
Start: 2019-03-08 | End: 2019-03-28

## 2019-03-08 NOTE — LETTER
March 8, 2019         Patient: Simi Massey   YOB: 1960   Date of Visit: 3/8/2019           To Whom it May Concern:    Simi Massey was seen in my clinic on 3/8/2019. She has been diagnosed with influenza B.     If you have any questions or concerns, please don't hesitate to call.        Sincerely,           KANDI Pineda  Electronically Signed

## 2019-03-08 NOTE — PATIENT INSTRUCTIONS

## 2019-03-08 NOTE — PROGRESS NOTES
Chief Complaint   Patient presents with   • Cough   • Sinusitis       HISTORY OF PRESENT ILLNESS: Patient is a 59 y.o. female who presents today due to complaints of nasal congestion, cough, fever, chills, headache, and sinus pressure since this morning. She denies associated difficulty breathing, confusion, nausea, vomiting or diarrhea. She has tried OTC cold/sinus medication at home without much improvement. Her  is ill at home with similar symptoms, he is currently on antibiotics. Her daughter lives with her at home and is currently being treated for cancer and undergoing chemo. No recent antibiotic usage.     Patient Active Problem List    Diagnosis Date Noted   • Family history of breast cancer 12/13/2018   • Cyst of left breast 07/30/2018   • Mood disorder (HCC) 01/18/2013   • Essential hypertension 10/12/2012   • Menopause 10/12/2012   • Sleep-disordered breathing 10/12/2012   • Dyslipidemia 09/07/2012   • Snoring 09/07/2012   • Tobacco use 09/07/2012       Allergies:Nkda [no known drug allergy]    Current Outpatient Prescriptions Ordered in [facility]   Medication Sig Dispense Refill   • chlorthalidone (HYGROTON) 25 MG Tab Take 1 Tab by mouth every day for 90 days. 90 Tab 0   • B Complex Vitamins (VITAMIN B COMPLEX) CAPS Take 1 Cap by mouth every day.       • aspirin (ASA) 325 MG TABS Take 650 mg by mouth 2 times a day as needed.       No current Epic-ordered facility-administered medications on file.        Past Medical History:   Diagnosis Date   • Anxiety    • Back pain    • Chickenpox    • Depression    • Somali measles    • Head ache    • Hypertension    • Influenza    • Pulmonary nodule    • Shingles        Social History   Substance Use Topics   • Smoking status: Current Every Day Smoker     Packs/day: 0.50     Years: 40.00     Types: Cigarettes   • Smokeless tobacco: Never Used      Comment: 15y @ 3ppd, quit x 2, then 1ppd (avd 1.5 ppd)   • Alcohol use Yes      Comment: once or twice a year   "      Family Status   Relation Status   • Mo    • Fa    • MGMo (Not Specified)   • MGFa         MVA   • Sis Alive   • Bro    • Ronit Alive     Family History   Problem Relation Age of Onset   • Lung Disease Mother    • Cancer Maternal Grandmother         Lung   • Breast Cancer Daughter        ROS:  Review of Systems   Constitutional: Positive for fever, chills, fatigue. Negative for weight loss.   HENT: Positive for sinus pressure, sore throat, nasal congestion. Negative for ear pain, nosebleeds, neck pain.    Eyes: Negative for vision changes.   Cardiovascular: Negative for chest pain, palpitations, orthopnea and leg swelling.   Respiratory: Positive for cough. Negative for sputum production, shortness of breath and wheezing.   Gastrointestinal: Negative for abdominal pain, nausea, vomiting or diarrhea.    Skin: Negative for rash, diaphoresis.     Exam:  Blood pressure 118/68, pulse (!) 105, temperature 37.3 °C (99.1 °F), temperature source Temporal, resp. rate 15, height 1.6 m (5' 3\"), weight 78.9 kg (174 lb), SpO2 95 %, not currently breastfeeding.  General: well-nourished, well-developed female in NAD  Head: normocephalic, atraumatic  Eyes: PERRLA, no conjunctival injection, acuity grossly intact, lids normal.  Ears: normal shape and symmetry, no tenderness, no discharge. External canals are without any significant edema or erythema. Tympanic membranes are without any inflammation, no effusion. Gross auditory acuity is intact.  Nose: symmetrical without tenderness, erythema and swelling noted bilateral turbinates, clear discharge. Maxillary sinus tenderness.   Mouth/Throat: reasonable hygiene, no exudates or tonsillar enlargement. Erythema is present.   Neck: no masses, range of motion within normal limits, no tracheal deviation. No obvious thyroid enlargement.   Lymph: no cervical adenopathy. No supraclavicular adenopathy.   Neuro: alert and oriented. Cranial nerves 1-12 grossly " intact. No sensory deficit.   Cardiovascular: regular rate (auscultated at 95), regular rhythm. No edema.  Pulmonary: no distress. Chest is symmetrical with respiration, no wheezes, crackles, or rhonchi.   Musculoskeletal: no clubbing, appropriate muscle tone, gait is stable.  Skin: warm, dry, intact, no clubbing, no cyanosis, no rashes.   Psych: appropriate mood, affect, judgement.         Assessment/Plan:  1. Influenza B  POCT Influenza A/B    oseltamivir (TAMIFLU) 75 MG Cap         POC flu positive for influenza B        Discussed symptoms most likely viral, self limiting illness. Did not see any evidence of a bacterial process. Discussed natural progression and sx care. Tamiflu provided.   Sleep with HOB elevated, humidifier at night, rest, increase fluid intake. Wear mask around daughter, notify her oncologist.   Supportive care, differential diagnoses, and indications for immediate follow-up discussed with patient.   Pathogenesis of diagnosis discussed including typical length and natural progression.   Instructed to return to clinic or nearest emergency department for any change in condition, further concerns, or worsening of symptoms.  Patient states understanding of the plan of care and discharge instructions.  Instructed to make an appointment, for follow up, with her primary care provider.        Please note that this dictation was created using voice recognition software. I have made every reasonable attempt to correct obvious errors, but I expect that there are errors of grammar and possibly content that I did not discover before finalizing the note.      ALPA Pineda.

## 2019-03-19 ENCOUNTER — OFFICE VISIT (OUTPATIENT)
Dept: PULMONOLOGY | Facility: HOSPICE | Age: 59
End: 2019-03-19
Payer: COMMERCIAL

## 2019-03-19 VITALS
WEIGHT: 180.2 LBS | HEART RATE: 82 BPM | HEIGHT: 63 IN | BODY MASS INDEX: 31.93 KG/M2 | TEMPERATURE: 97.5 F | OXYGEN SATURATION: 95 % | RESPIRATION RATE: 16 BRPM | SYSTOLIC BLOOD PRESSURE: 130 MMHG | DIASTOLIC BLOOD PRESSURE: 72 MMHG

## 2019-03-19 DIAGNOSIS — R91.8 PULMONARY NODULES: ICD-10-CM

## 2019-03-19 DIAGNOSIS — Z72.0 TOBACCO ABUSE: ICD-10-CM

## 2019-03-19 PROCEDURE — 99214 OFFICE O/P EST MOD 30 MIN: CPT | Performed by: INTERNAL MEDICINE

## 2019-03-19 NOTE — PROGRESS NOTES
Chief Complaint   Patient presents with   • Results     CT Results       HPI: This patient is a 59 y.o. Female who returns to lung nodule clinic from Lung Cancer Screening program.  The patient is an active smoker with estimated 60 pack years to date.  She has been cutting back on her smoking significantly and is interested in quitting.    Her daughter recently completed chemotherapy for breast cancer, and Simi will hopefully be able to focus more on herself now.  She denies shortness of breath, chest pain, hemoptysis or weight loss. She denies a history of pneumonia or tuberculosis.  Low-dose lung cancer screening chest CAT scan on September 17, 2018 was reviewed and showed numerous, <6mm pulmonary nodules scattered bilaterally in addition to mild emphysema.  Her 6-month follow-up chest CAT scan on March 8, 2019 was reviewed and showed stable nodules.      Past Medical History:   Diagnosis Date   • Anxiety    • Back pain    • Chickenpox    • Depression    • Latvian measles    • Head ache    • Hypertension    • Influenza    • Pulmonary nodule    • Shingles        Social History     Social History   • Marital status:      Spouse name: N/A   • Number of children: N/A   • Years of education: N/A     Occupational History   • Not on file.     Social History Main Topics   • Smoking status: Current Every Day Smoker     Packs/day: 0.50     Years: 40.00     Types: Cigarettes   • Smokeless tobacco: Never Used      Comment: 15y @ 3ppd, quit x 2, then 1ppd (avd 1.5 ppd)   • Alcohol use Yes      Comment: once or twice a year    • Drug use: No   • Sexual activity: Yes     Partners: Male     Other Topics Concern   • Not on file     Social History Narrative   • No narrative on file       Family History   Problem Relation Age of Onset   • Lung Disease Mother    • Cancer Maternal Grandmother         Lung   • Breast Cancer Daughter        Current Outpatient Prescriptions on File Prior to Visit   Medication Sig Dispense Refill  "  • chlorthalidone (HYGROTON) 25 MG Tab Take 1 Tab by mouth every day for 90 days. 90 Tab 0   • B Complex Vitamins (VITAMIN B COMPLEX) CAPS Take 1 Cap by mouth every day.       • aspirin (ASA) 325 MG TABS Take 650 mg by mouth 2 times a day as needed.     • oseltamivir (TAMIFLU) 75 MG Cap Take 1 Cap by mouth 2 times a day. (Patient not taking: Reported on 3/19/2019) 10 Cap 0     No current facility-administered medications on file prior to visit.        Allergies: Nkda [no known drug allergy]    ROS:   Constitutional: Denies fevers, chills, night sweats, fatigue or weight loss  Eyes: Denies vision loss, pain, drainage, double vision  Ears, Nose, Throat: Denies earache, difficulty hearing, tinnitus, nasal congestion, hoarseness  Cardiovascular: Denies chest pain, tightness, palpitations, orthopnea or edema  Respiratory: Denies shortness of breath, cough, wheezing, hemoptysis  Sleep: Denies daytime sleepiness, snoring, apneas, insomnia, morning headaches  GI: Denies heartburn, dysphagia, nausea, abdominal pain, diarrhea or constipation  : Denies frequent urination, hematuria, discharge or painful urination  Musculoskeletal: Denies back pain, painful joints, sore muscles  Neurological: Denies weakness or headaches  Skin: No rashes    Blood pressure 130/72, pulse 82, temperature 36.4 °C (97.5 °F), temperature source Temporal, resp. rate 16, height 1.6 m (5' 3\"), weight 81.7 kg (180 lb 3.2 oz), SpO2 95 %, not currently breastfeeding.    Physical Exam:  Appearance: Well-nourished, well-developed, in no acute distress  HEENT: Normocephalic, atraumatic, white sclera, PERRLA, oropharynx clear  Neck: No adenopathy or masses  Respiratory: no intercostal retractions or accessory muscle use  Lungs auscultation: Clear to auscultation bilaterally  Cardiovascular: Regular rate rhythm. No murmurs, rubs or gallops.  No LE edema  Abdomen: soft, nondistended  Gait: Normal  Digits: No clubbing, cyanosis  Motor: No focal " deficits  Orientation: Oriented to time, person and place    Diagnosis:  1. Pulmonary nodules  CT-CHEST (THORAX) W/O   2. Tobacco abuse         Plan:  The patient's has pulmonary micronodules, felt postinflammatory, stable on six-month follow-up chest imaging.  The nodules are too small for biopsy or PET resolution.  She is asymptomatic.  Smoking cessation counseling provided.  Chest CAT scan without contrast in 6 months for follow-up of nodules.  Return in about 6 months (around 9/19/2019) for at lung nodule clinic.

## 2019-03-19 NOTE — PATIENT INSTRUCTIONS
Tobacco Use Disorder  Tobacco use disorder (TUD) is a mental disorder. It is the long-term use of tobacco in spite of related health problems or difficulty with normal life activities. Tobacco is most commonly smoked as cigarettes and less commonly as cigars or pipes. Smokeless chewing tobacco and snuff are also popular. People with TUD get a feeling of extreme pleasure (euphoria) from using tobacco and have a desire to use it again and again. Repeated use of tobacco can cause problems.  The addictive effects of tobacco are due mainly to the ingredient nicotine. Nicotine also causes a rush of adrenaline (epinephrine) in the body. This leads to increased blood pressure, heart rate, and breathing rate. These changes may cause problems for people with high blood pressure, weak hearts, or lung disease. High doses of nicotine in children and pets can lead to seizures and death.  Tobacco contains a number of other unsafe chemicals. These chemicals are especially harmful when inhaled as smoke and can damage almost every organ in the body. Smokers live shorter lives than nonsmokers and are at risk of dying from a number of diseases and cancers. Tobacco smoke can also cause health problems for nonsmokers (due to inhaling secondhand smoke). Smoking is also a fire hazard.  TUD usually starts in the late teenage years and is most common in young adults between the ages of 18 and 25 years. People who start smoking earlier in life are more likely to continue smoking as adults. TUD is somewhat more common in men than women. People with TUD are at higher risk for using alcohol and other drugs of abuse.  What increases the risk?  Risk factors for TUD include:  · Having family members with the disorder.  · Being around people who use tobacco.  · Having an existing mental health issue such as schizophrenia, depression, bipolar disorder, ADHD, or posttraumatic stress disorder (PTSD).  What are the signs or symptoms?  People with  tobacco use disorder have two or more of the following signs and symptoms within 12 months:  · Use of more tobacco over a longer period than intended.  · Not able to cut down or control tobacco use.  · A lot of time spent obtaining or using tobacco.  · Strong desire or urge to use tobacco (craving). Cravings may last for 6 months or longer after quitting.  · Use of tobacco even when use leads to major problems at work, school, or home.  · Use of tobacco even when use leads to relationship problems.  · Giving up or cutting down on important life activities because of tobacco use.  · Repeatedly using tobacco in situations where it puts you or others in physical danger, like smoking in bed.  · Use of tobacco even when it is known that a physical or mental problem is likely related to tobacco use.  ¨ Physical problems are numerous and may include chronic bronchitis, emphysema, lung and other cancers, gum disease, high blood pressure, heart disease, and stroke.  ¨ Mental problems caused by tobacco may include difficulty sleeping and anxiety.  · Need to use greater amounts of tobacco to get the same effect. This means you have developed a tolerance.  · Withdrawal symptoms as a result of stopping or rapidly cutting back use. These symptoms may last a month or more after quitting and include the following:  ¨ Depressed, anxious, or irritable mood.  ¨ Difficulty concentrating.  ¨ Increased appetite.  ¨ Restlessness or trouble sleeping.  ¨ Use of tobacco to avoid withdrawal symptoms.  How is this diagnosed?  Tobacco use disorder is diagnosed by your health care provider. A diagnosis may be made by:  · Your health care provider asking questions about your tobacco use and any problems it may be causing.  · A physical exam.  · Lab tests.  · You may be referred to a mental health professional or addiction specialist.  The severity of tobacco use disorder depends on the number of signs and symptoms you have:  · Mild--Two or three  symptoms.  · Moderate--Four or five symptoms.  · Severe--Six or more symptoms.  How is this treated?  Many people with tobacco use disorder are unable to quit on their own and need help. Treatment options include the following:  · Nicotine replacement therapy (NRT). NRT provides nicotine without the other harmful chemicals in tobacco. NRT gradually lowers the dosage of nicotine in the body and reduces withdrawal symptoms. NRT is available in over-the-counter forms (gum, lozenges, and skin patches) as well as prescription forms (mouth inhaler and nasal spray).  · Medicines. This may include:  ¨ Antidepressant medicine that may reduce nicotine cravings.  ¨ A medicine that acts on nicotine receptors in the brain to reduce cravings and withdrawal symptoms. It may also block the effects of tobacco in people with TUD who relapse.  · Counseling or talk therapy. A form of talk therapy called behavioral therapy is commonly used to treat people with TUD. Behavioral therapy looks at triggers for tobacco use, how to avoid them, and how to cope with cravings. It is most effective in person or by phone but is also available in self-help forms (books and Internet websites).  · Support groups. These provide emotional support, advice, and guidance for quitting tobacco.  The most effective treatment for TUD is usually a combination of medicine, talk therapy, and support groups.  Follow these instructions at home:  · Keep all follow-up visits as directed by your health care provider. This is important.  · Take medicines only as directed by your health care provider.  · Check with your health care provider before starting new prescription or over-the-counter medicines.  Contact a health care provider if:  · You are not able to take your medicines as prescribed.  · Treatment is not helping your TUD and your symptoms get worse.  Get help right away if:  · You have serious thoughts about hurting yourself or others.  · You have trouble  breathing, chest pain, sudden weakness, or sudden numbness in part of your body.  This information is not intended to replace advice given to you by your health care provider. Make sure you discuss any questions you have with your health care provider.  Document Released: 08/23/2005 Document Revised: 08/20/2017 Document Reviewed: 02/13/2015  [employer] Interactive Patient Education © 2017 [employer]

## 2019-03-24 ENCOUNTER — HOSPITAL ENCOUNTER (OUTPATIENT)
Facility: MEDICAL CENTER | Age: 59
End: 2019-03-24
Attending: PHYSICIAN ASSISTANT
Payer: COMMERCIAL

## 2019-03-24 ENCOUNTER — OFFICE VISIT (OUTPATIENT)
Dept: URGENT CARE | Facility: PHYSICIAN GROUP | Age: 59
End: 2019-03-24
Payer: COMMERCIAL

## 2019-03-24 VITALS
RESPIRATION RATE: 16 BRPM | BODY MASS INDEX: 33.99 KG/M2 | SYSTOLIC BLOOD PRESSURE: 120 MMHG | WEIGHT: 180 LBS | HEART RATE: 68 BPM | HEIGHT: 61 IN | TEMPERATURE: 98.2 F | DIASTOLIC BLOOD PRESSURE: 74 MMHG | OXYGEN SATURATION: 95 %

## 2019-03-24 DIAGNOSIS — M54.50 ACUTE BILATERAL LOW BACK PAIN WITHOUT SCIATICA: ICD-10-CM

## 2019-03-24 LAB
APPEARANCE UR: CLEAR
BILIRUB UR STRIP-MCNC: NEGATIVE MG/DL
COLOR UR AUTO: YELLOW
GLUCOSE UR STRIP.AUTO-MCNC: NEGATIVE MG/DL
KETONES UR STRIP.AUTO-MCNC: NEGATIVE MG/DL
LEUKOCYTE ESTERASE UR QL STRIP.AUTO: NEGATIVE
NITRITE UR QL STRIP.AUTO: NEGATIVE
PH UR STRIP.AUTO: 5 [PH] (ref 5–8)
PROT UR QL STRIP: NEGATIVE MG/DL
RBC UR QL AUTO: NEGATIVE
SP GR UR STRIP.AUTO: 1.02
UROBILINOGEN UR STRIP-MCNC: 0.2 MG/DL

## 2019-03-24 PROCEDURE — 81002 URINALYSIS NONAUTO W/O SCOPE: CPT | Performed by: PHYSICIAN ASSISTANT

## 2019-03-24 PROCEDURE — 99213 OFFICE O/P EST LOW 20 MIN: CPT | Performed by: PHYSICIAN ASSISTANT

## 2019-03-24 PROCEDURE — 87086 URINE CULTURE/COLONY COUNT: CPT

## 2019-03-24 NOTE — PROGRESS NOTES
"Chief Complaint   Patient presents with   • UTI     x 3 days        HISTORY OF PRESENT ILLNESS: Patient is a 59 y.o. female who presents today for 3 days of mid-lower back pain, possible slight urinary urgency, concern for kidney pain.  Patient notes the pain is equal/bilateral and does feel deep.  It is not severe but persistent. Responsive very much to Advil.   She has not had burning with urination, frequency, hematuria.  No fevers and states \" I don't feel bad overall\"   She denies nausea, vomiting.   She took at home test and did have \"purple but not pink\"    Patient Active Problem List    Diagnosis Date Noted   • Family history of breast cancer 12/13/2018   • Cyst of left breast 07/30/2018   • Mood disorder (HCC) 01/18/2013   • Essential hypertension 10/12/2012   • Menopause 10/12/2012   • Sleep-disordered breathing 10/12/2012   • Dyslipidemia 09/07/2012   • Snoring 09/07/2012   • Tobacco use 09/07/2012       Allergies:Nkda [no known drug allergy]    Current Outpatient Prescriptions Ordered in [facility]   Medication Sig Dispense Refill   • oseltamivir (TAMIFLU) 75 MG Cap Take 1 Cap by mouth 2 times a day. 10 Cap 0   • chlorthalidone (HYGROTON) 25 MG Tab Take 1 Tab by mouth every day for 90 days. 90 Tab 0   • B Complex Vitamins (VITAMIN B COMPLEX) CAPS Take 1 Cap by mouth every day.       • aspirin (ASA) 325 MG TABS Take 650 mg by mouth 2 times a day as needed.       No current Epic-ordered facility-administered medications on file.        Past Medical History:   Diagnosis Date   • Anxiety    • Back pain    • Chickenpox    • Depression    • Divehi measles    • Head ache    • Hypertension    • Influenza    • Pulmonary nodule    • Shingles        Social History   Substance Use Topics   • Smoking status: Current Every Day Smoker     Packs/day: 0.50     Years: 40.00     Types: Cigarettes   • Smokeless tobacco: Never Used      Comment: 15y @ 3ppd, quit x 2, then 1ppd (avd 1.5 ppd)   • Alcohol use Yes      Comment: once " "or twice a year        Family Status   Relation Status   • Mo    • Fa    • MGMo (Not Specified)   • MGFa         MVA   • Sis Alive   • Bro    • Ronit Alive     Family History   Problem Relation Age of Onset   • Lung Disease Mother    • Cancer Maternal Grandmother         Lung   • Breast Cancer Daughter        ROS:  Review of Systems   Constitutional: Negative for fever, chills, weight loss and malaise/fatigue.   HENT: Negative for ear pain, nosebleeds, congestion, sore throat and neck pain.    Eyes: Negative for blurred vision.   Respiratory: Negative for cough, sputum production, shortness of breath and wheezing.    Cardiovascular: Negative for chest pain, palpitations, orthopnea and leg swelling.   Gastrointestinal: Negative for heartburn, nausea, vomiting and abdominal pain.   Genitourinary: SEE HPI    Exam:  Blood pressure 120/74, pulse 68, temperature 36.8 °C (98.2 °F), temperature source Temporal, resp. rate 16, height 1.549 m (5' 1\"), weight 81.6 kg (180 lb), SpO2 95 %, not currently breastfeeding.  General:  Well nourished, well developed female in NAD  Eyes: PERRLA, EOM within normal limits, no conjunctival injection, no scleral icterus, visual fields and acuity grossly intact.  Mouth: reasonable hygiene, no erythema exudates or tonsillar enlargement.  Neck: no masses, range of motion within normal limits, no tracheal deviation. No lymphadenopathy  Pulmonary: Normal respiratory effort, no wheezes, crackles, or rhonchi.  Cardiovascular: regular rate and rhythm without murmurs, rubs, or gallops.  Abdomen: Soft, nontender, nondistended. Normal bowel sounds. No hepatosplenomegaly or masses, or hernias. No rebound or guarding. No CVA tenderness.   Skin: No visible rashes or lesion. Warm, pink, dry.   Extremities: no clubbing, cyanosis, or edema.  Neuro: A&O x 3. Speech normal/clear.  Normal gait.         Assessment/Plan:  1. Acute bilateral low back pain without sciatica  POCT " Urinalysis    URINE CULTURE(NEW)         -U/A without leuks, nitrites or blood.    Benign exam overall, afebrile.    -will culture, she has follow up with PCP in 4 days.  RTC precautions in meantime.         Supportive care, differential diagnoses, and indications for immediate follow-up discussed with patient.   Pathogenesis of diagnosis discussed including typical length and natural progression.   Instructed to return to clinic or nearest emergency department for any change in condition, further concerns, or worsening of symptoms.  Patient states understanding of the plan of care and discharge instructions.        Shyla Bell P.A.-C.

## 2019-03-26 LAB
BACTERIA UR CULT: NORMAL
SIGNIFICANT IND 70042: NORMAL
SITE SITE: NORMAL
SOURCE SOURCE: NORMAL

## 2019-03-27 ENCOUNTER — TELEPHONE (OUTPATIENT)
Dept: URGENT CARE | Facility: PHYSICIAN GROUP | Age: 59
End: 2019-03-27

## 2019-03-28 ENCOUNTER — OFFICE VISIT (OUTPATIENT)
Dept: INTERNAL MEDICINE | Facility: MEDICAL CENTER | Age: 59
End: 2019-03-28
Payer: COMMERCIAL

## 2019-03-28 VITALS
DIASTOLIC BLOOD PRESSURE: 95 MMHG | BODY MASS INDEX: 33.99 KG/M2 | SYSTOLIC BLOOD PRESSURE: 139 MMHG | HEIGHT: 61 IN | WEIGHT: 180 LBS | TEMPERATURE: 98.5 F | OXYGEN SATURATION: 95 % | HEART RATE: 98 BPM

## 2019-03-28 DIAGNOSIS — Z72.0 TOBACCO USE: ICD-10-CM

## 2019-03-28 DIAGNOSIS — F39 MOOD DISORDER (HCC): ICD-10-CM

## 2019-03-28 DIAGNOSIS — Z23 NEED FOR VACCINATION: ICD-10-CM

## 2019-03-28 DIAGNOSIS — I10 ESSENTIAL HYPERTENSION: ICD-10-CM

## 2019-03-28 PROCEDURE — 99213 OFFICE O/P EST LOW 20 MIN: CPT | Mod: GE,25 | Performed by: INTERNAL MEDICINE

## 2019-03-28 PROCEDURE — 90636 HEP A/HEP B VACC ADULT IM: CPT | Performed by: INTERNAL MEDICINE

## 2019-03-28 PROCEDURE — 90471 IMMUNIZATION ADMIN: CPT | Performed by: INTERNAL MEDICINE

## 2019-03-28 ASSESSMENT — PATIENT HEALTH QUESTIONNAIRE - PHQ9
CLINICAL INTERPRETATION OF PHQ2 SCORE: 2
SUM OF ALL RESPONSES TO PHQ QUESTIONS 1-9: 6
5. POOR APPETITE OR OVEREATING: 0 - NOT AT ALL

## 2019-03-28 NOTE — PROGRESS NOTES
Established Patient    Simi presents today with the following:    CC: Follow up for HTN, Tobacco use, mood disorder.    HPI: Patient is a pleasant 59-year-old female with hypertension here to follow-up.  Patient's blood pressure at this visit is okay, but not at goal.  Two previous blood pressures were the patient was seen at urgent care were at goal.  Patient does have some fluctuations of her blood pressure due to increased life stress.  Patient denies headaches, changes in vision, dyspnea, chest pain, nausea, vomiting, abdominal pain, changes in bladder or bowel habits.    Patient continues to smoke daily, but has cut down her cigarette intake to 8 cigarettes/day.  Reports that due to the stress of her daughter going through chemotherapy, she is not currently able to quit completely.  Patient also reports occasional bouts of low mood due to her daughter's illness and not having time for herself, but denies suicidal or homicidal ideation and is hopeful that things will get better.    Patient has also had a repeat breast ultrasound for a breast cyst which has been stable.  Her next one will be done in June of this year with a mammogram.  Patient is also followed up with pulmonology for lung cancer screening and the nodules in her lungs are stable; next CT chest in 6 months.    Patient Active Problem List    Diagnosis Date Noted   • Family history of breast cancer 12/13/2018   • Cyst of left breast 07/30/2018   • Mood disorder (HCC) 01/18/2013   • Essential hypertension 10/12/2012   • Menopause 10/12/2012   • Sleep-disordered breathing 10/12/2012   • Dyslipidemia 09/07/2012   • Snoring 09/07/2012   • Tobacco use 09/07/2012       Current Outpatient Prescriptions   Medication Sig Dispense Refill   • chlorthalidone (HYGROTON) 25 MG Tab Take 1 Tab by mouth every day for 90 days. 90 Tab 0   • B Complex Vitamins (VITAMIN B COMPLEX) CAPS Take 1 Cap by mouth every day.       • aspirin (ASA) 325 MG TABS Take 650 mg by  "mouth 2 times a day as needed.       No current facility-administered medications for this visit.        ROS: As per HPI. Additional pertinent systems as noted below.  Constitutional: Negative for chills and fever.   HENT: Negative for ear pain and sore throat.    Eyes: Negative for discharge and redness.   Respiratory: Negative for cough, hemoptysis, wheezing and stridor.    Cardiovascular: Negative for chest pain, palpitations and leg swelling.   Gastrointestinal: Negative for abdominal pain, constipation, diarrhea, heartburn, nausea and vomiting.   Genitourinary: Negative for dysuria, flank pain and hematuria.   Musculoskeletal: Negative for falls and myalgias.   Skin: Negative for itching and rash.   Neurological: Negative for dizziness, seizures, loss of consciousness and headaches.   Endo/Heme/Allergies: Negative for polydipsia. Does not bruise/bleed easily.   Psychiatric/Behavioral: Negative for substance abuse and suicidal ideas.       /95 (BP Location: Left arm, Patient Position: Sitting, BP Cuff Size: Adult)   Pulse 98   Temp 36.9 °C (98.5 °F) (Temporal)   Ht 1.549 m (5' 1\")   Wt 81.6 kg (180 lb)   LMP  (LMP Unknown)   SpO2 95%   BMI 34.01 kg/m²     Physical Exam   Constitutional:  oriented to person, place, and time. No distress.   Eyes: Pupils are equal, round, and reactive to light. No scleral icterus.  Neck: Neck supple. No thyromegaly present.   Cardiovascular: Normal rate, regular rhythm and normal heart sounds.  Exam reveals no gallop and no friction rub.  No murmur heard.  Pulmonary/Chest: Breath sounds normal. Chest wall is not dull to percussion.   Musculoskeletal:   no edema.   Lymphadenopathy: no cervical adenopathy  Neurological: alert and oriented to person, place, and time.   Skin: No cyanosis. Nails show no clubbing.    Note: I have reviewed all pertinent labs and diagnostic tests associated with this visit with specific comments listed under the assessment and plan " below    Assessment and Plan    1. Essential hypertension  Patient has achieved better blood pressure control with 25 mg of chlorthalidone daily.  Has occasional fluctuations of blood pressure due to stress.  Patient asymptomatic.  Continue chlorthalidone.    2. Tobacco use  Patient continues to smoke, but has greatly reduced her cigarette intake.  Counseled on smoking cessation.    3. Mood disorder (HCC)  Patient has situational depression due to a stressful life period. She is not currently interested in formal counseling. Denies SI/HI.    4. Need for vaccination  Last dose of twinrix administered today.      Followup: Return in about 2 months (around 6/5/2019), or with ME, please., for Long.      Signed by: Radha Garsia M.D.

## 2019-03-28 NOTE — PATIENT INSTRUCTIONS
[facility]  [address] [place] [address]   [phone]

## 2019-03-28 NOTE — TELEPHONE ENCOUNTER
Called patient and informed her of negative urine culture results. She reports her pain has improved and appreciates the phone call.

## 2019-03-28 NOTE — NON-PROVIDER
"Simi Massey is a 59 y.o. female here for a non-provider visit for:   TWINRIX (Hep A/Hep B) 1 of 1    Reason for immunization: Overdue/Provider Recommended  Immunization records indicate need for vaccine: Yes, confirmed with Epic  Minimum interval has been met for this vaccine: Yes  ABN completed: Not Indicated    Order and dose verified by: Tiffany  VIS Dated  7/20/16 was given to patient: Yes  All IAC Questionnaire questions were answered \"No.\"    Patient tolerated injection and no adverse effects were observed or reported: Yes    Pt scheduled for next dose in series: Not Indicated    "

## 2019-06-03 ENCOUNTER — OFFICE VISIT (OUTPATIENT)
Dept: INTERNAL MEDICINE | Facility: MEDICAL CENTER | Age: 59
End: 2019-06-03
Payer: COMMERCIAL

## 2019-06-03 VITALS
HEIGHT: 61 IN | OXYGEN SATURATION: 94 % | SYSTOLIC BLOOD PRESSURE: 126 MMHG | TEMPERATURE: 97.2 F | HEART RATE: 83 BPM | DIASTOLIC BLOOD PRESSURE: 80 MMHG | WEIGHT: 180 LBS | BODY MASS INDEX: 33.99 KG/M2

## 2019-06-03 DIAGNOSIS — I10 ESSENTIAL HYPERTENSION: ICD-10-CM

## 2019-06-03 DIAGNOSIS — Z72.0 TOBACCO USE: ICD-10-CM

## 2019-06-03 DIAGNOSIS — Z12.39 SCREENING FOR BREAST CANCER: ICD-10-CM

## 2019-06-03 DIAGNOSIS — F39 MOOD DISORDER (HCC): ICD-10-CM

## 2019-06-03 PROCEDURE — 99213 OFFICE O/P EST LOW 20 MIN: CPT | Mod: GE | Performed by: INTERNAL MEDICINE

## 2019-06-03 RX ORDER — CHLORTHALIDONE 25 MG/1
25 TABLET ORAL DAILY
Qty: 90 TAB | Refills: 0 | Status: SHIPPED | OUTPATIENT
Start: 2019-06-03 | End: 2022-01-13

## 2019-06-03 RX ORDER — CHLORTHALIDONE 25 MG/1
25 TABLET ORAL DAILY
COMMUNITY
End: 2019-06-03 | Stop reason: SDUPTHER

## 2019-06-03 ASSESSMENT — PATIENT HEALTH QUESTIONNAIRE - PHQ9
CLINICAL INTERPRETATION OF PHQ2 SCORE: 1
SUM OF ALL RESPONSES TO PHQ QUESTIONS 1-9: 3
5. POOR APPETITE OR OVEREATING: 1 - SEVERAL DAYS

## 2019-06-03 NOTE — PROGRESS NOTES
Established Patient    Simi presents today with the following:    CC: Follow up for HTN.    HPI: Patient is a pleasant 59 y.o female here to follow up for HTN. She reports compliance with her medications and denies headaches, changes in VA, dyspnea, chest pain, abdominal pain, N/V, urinary changes. Patient also reports that her life is becoming less stressful now that her daughter completed her chemotherapy. She reports a slight improvement in mood, though her daughter who is present during this visit notes that her mom is still having some episodes of decreased mood and some difficulty dealing with social situations.  Patient has seen a therapist in the past, but only for a few visits this therapist moved.  Patient still continues to smoke about 8 cigarettes/day which is a significant decrease in which she was smoking last year which was 1 pack/day, but has not been able to completely quit.  Patient does have a goal to quit smoking, but states that right now she does not want to as it is helping her deal with stress.    Patient Active Problem List    Diagnosis Date Noted   • Family history of breast cancer 12/13/2018   • Cyst of left breast 07/30/2018   • Mood disorder (HCC) 01/18/2013   • Essential hypertension 10/12/2012   • Menopause 10/12/2012   • Sleep-disordered breathing 10/12/2012   • Dyslipidemia 09/07/2012   • Snoring 09/07/2012   • Tobacco use 09/07/2012       Current Outpatient Prescriptions   Medication Sig Dispense Refill   • chlorthalidone (HYGROTON) 25 MG Tab Take 1 Tab by mouth every day. 90 Tab 0   • nicotine (NICODERM) 7 MG/24HR PATCH 24 HR Apply 1 Patch to skin as directed every 24 hours. 30 Patch 0   • B Complex Vitamins (VITAMIN B COMPLEX) CAPS Take 1 Cap by mouth every day.       • aspirin (ASA) 325 MG TABS Take 650 mg by mouth 2 times a day as needed.       No current facility-administered medications for this visit.        ROS: As per HPI. Additional pertinent systems as noted  "below.  Constitutional: Negative for chills and fever.   HENT: Negative for ear pain and sore throat.    Eyes: Negative for discharge and redness.   Respiratory: Negative for cough, hemoptysis, wheezing and stridor.    Cardiovascular: Negative for chest pain, palpitations and leg swelling.   Gastrointestinal: Negative for abdominal pain, constipation, diarrhea, heartburn, nausea and vomiting.   Genitourinary: Negative for dysuria, flank pain and hematuria.   Musculoskeletal: Negative for falls and myalgias.   Skin: Negative for itching and rash.   Neurological: Negative for dizziness, seizures, loss of consciousness and headaches.   Endo/Heme/Allergies: Negative for polydipsia. Does not bruise/bleed easily.   Psychiatric/Behavioral: Negative for substance abuse and suicidal ideas.       /80   Pulse 83   Temp 36.2 °C (97.2 °F)   Ht 1.549 m (5' 1\")   Wt 81.6 kg (180 lb)   LMP  (LMP Unknown)   SpO2 94%   BMI 34.01 kg/m²      Physical Exam   Constitutional:  oriented to person, place, and time. No distress.   Eyes: Pupils are equal, round, and reactive to light. No scleral icterus.  Neck: Neck supple. No thyromegaly present.   Cardiovascular: Normal rate, regular rhythm and normal heart sounds.  Exam reveals no gallop and no friction rub.  No murmur heard.  Pulmonary/Chest: Breath sounds normal. Chest wall is not dull to percussion.   Musculoskeletal:   no edema.   Lymphadenopathy: no cervical adenopathy  Neurological: alert and oriented to person, place, and time.   Skin: No cyanosis. Nails show no clubbing.    Note: I have reviewed all pertinent labs and diagnostic tests associated with this visit with specific comments listed under the assessment and plan below    Assessment and Plan    1. Essential hypertension  Well-controlled on current medication regimen.  Continue chlorthalidone 25 mg daily.  Encouraged patient to continue medication compliance, counseled her on regular exercise to help with blood " pressure control, counseled on diet.    2. Mood disorder (HCC)  Patient notes improved life stress situation since her daughter has finished her chemo.  She does note that she is getting better, but is not back to baseline.  Based on the patient's symptoms that she has described to me, I feel that she would benefit most from behavioral therapy to learn coping mechanisms to deal with stress.  Referral to behavioral health provided.  I strongly encouraged the patient to make an appointment to see a therapist.  Advised patient to improve her diet, start regular exercise program particularly stretching to help with mood.  Counseled patient on sleep hygiene and getting in a sleep to help with this issue.    3. Screening for breast cancer  Patient noted to have cyst in her left breast last year during her routine mammogram.  Sonographic follow-up has shown that the cyst has been stable in size and consistency.  Patient is due for mammogram mid June.  Mammogram ordered.    4.  Tobacco use  Patient is a current daily smoker and smokes about 8 cigarettes/day.  She does not want to quit eventually, but not at this moment as she notes that it helps her deal with stress.  Patient has nicotine patches and lozenges -counseled patient on proper way to use them.  Advised patient to note when her cravings are the strongest and try to change her routine to help with the cravings.      Followup: Return in about 3 months (around 9/3/2019) for with Dr Palma to establish.  Patient will need follow-up for hypertension control, counseling on tobacco cessation.  Follow-up to see if the patient has been to see a therapist to learn coping and resilience skills to help deal with life stresses.  Follow-up mammogram results for left breast cyst.     Signed by: Radha Garsia M.D.

## 2019-06-03 NOTE — Clinical Note
Of note, patient also has intermittent aches and pains that she has been referred to physical therapy for in the past, but was not able to go due to time constraints.  She says she usually feels better when she does exercise and will try to get back into the habit of doing it to help. If she still hasn't started to exercise when she sees you, encourage PT to at least learn the exercises (she has been reluctant to start thus far).

## 2019-06-10 ENCOUNTER — TELEPHONE (OUTPATIENT)
Dept: INTERNAL MEDICINE | Facility: MEDICAL CENTER | Age: 59
End: 2019-06-10

## 2019-06-10 DIAGNOSIS — Z80.3 FAMILY HISTORY OF BREAST CANCER: ICD-10-CM

## 2019-06-10 DIAGNOSIS — Z12.39 SCREENING FOR BREAST CANCER: ICD-10-CM

## 2019-06-10 DIAGNOSIS — N60.02 CYST OF LEFT BREAST: ICD-10-CM

## 2019-06-10 NOTE — TELEPHONE ENCOUNTER
"----- Message from Darleen Calvertena sent at 6/10/2019  8:54 AM PDT -----  Regarding: Mammogram  Contact: [phone]  PT came in and was told by imaging that she specifically needs to have a \"3D Mammogram-both breast bilateral and a whole breast ultrasound bilateral.\" Dr. Garsia told her at her last visit that her insurance would not cover if it was billed like that but they did call insurance to make sure that it would be covered that way. She said the reasoning is that she has dense breast tissue and a family history of breast cancer. She also stated that  knows that she has a cyst on her breast that needs an ultrasound. They did ask that this is done soon as possible.  "

## 2019-07-08 ENCOUNTER — OFFICE VISIT (OUTPATIENT)
Dept: URGENT CARE | Facility: PHYSICIAN GROUP | Age: 59
End: 2019-07-08
Payer: COMMERCIAL

## 2019-07-08 VITALS
BODY MASS INDEX: 33.99 KG/M2 | HEART RATE: 112 BPM | WEIGHT: 180 LBS | HEIGHT: 61 IN | RESPIRATION RATE: 14 BRPM | OXYGEN SATURATION: 93 % | SYSTOLIC BLOOD PRESSURE: 130 MMHG | TEMPERATURE: 99.6 F | DIASTOLIC BLOOD PRESSURE: 72 MMHG

## 2019-07-08 DIAGNOSIS — J01.00 ACUTE NON-RECURRENT MAXILLARY SINUSITIS: ICD-10-CM

## 2019-07-08 PROCEDURE — 99214 OFFICE O/P EST MOD 30 MIN: CPT | Performed by: FAMILY MEDICINE

## 2019-07-08 RX ORDER — BENZONATATE 100 MG/1
100 CAPSULE ORAL 3 TIMES DAILY PRN
Qty: 60 CAP | Refills: 0 | Status: SHIPPED | OUTPATIENT
Start: 2019-07-08 | End: 2021-09-10

## 2019-07-08 RX ORDER — AMOXICILLIN AND CLAVULANATE POTASSIUM 875; 125 MG/1; MG/1
1 TABLET, FILM COATED ORAL 2 TIMES DAILY
Qty: 14 TAB | Refills: 0 | Status: SHIPPED | OUTPATIENT
Start: 2019-07-08 | End: 2019-07-15

## 2019-07-09 NOTE — PATIENT INSTRUCTIONS

## 2019-07-09 NOTE — PROGRESS NOTES
"Subjective:   Simi Massey is a 59 y.o. female who presents for Cough (congestion, yellow/green mucus, sinus pain X 2 days )        Cough   This is a new problem. The current episode started in the past 7 days. The problem has been gradually worsening. The problem occurs every few minutes. The cough is productive of sputum. Associated symptoms include headaches and nasal congestion. Pertinent negatives include no chills, fever, shortness of breath or wheezing.     Review of Systems   Constitutional: Negative for chills and fever.   HENT: Positive for congestion and sinus pain.    Respiratory: Positive for cough. Negative for shortness of breath and wheezing.    Neurological: Positive for headaches.     Allergies   Allergen Reactions   • Nkda [No Known Drug Allergy]       Objective:   /72   Pulse (!) 112   Temp 37.6 °C (99.6 °F)   Resp 14   Ht 1.549 m (5' 1\")   Wt 81.6 kg (180 lb)   LMP  (LMP Unknown)   SpO2 93%   BMI 34.01 kg/m²   Physical Exam   Constitutional: She is oriented to person, place, and time. She appears well-developed and well-nourished. No distress.   HENT:   Head: Normocephalic and atraumatic.   Nose: Mucosal edema and rhinorrhea present. Right sinus exhibits maxillary sinus tenderness. Left sinus exhibits maxillary sinus tenderness.   Eyes: Pupils are equal, round, and reactive to light. Conjunctivae and EOM are normal.   Cardiovascular: Normal rate and regular rhythm.    No murmur heard.  Pulmonary/Chest: Effort normal and breath sounds normal. No respiratory distress.   Abdominal: Soft. She exhibits no distension. There is no tenderness.   Neurological: She is alert and oriented to person, place, and time. She has normal reflexes. No sensory deficit.   Skin: Skin is warm and dry.   Psychiatric: She has a normal mood and affect.         Assessment/Plan:   1. Acute non-recurrent maxillary sinusitis  - amoxicillin-clavulanate (AUGMENTIN) 875-125 MG Tab; Take 1 Tab by mouth 2 times a " day for 7 days.  Dispense: 14 Tab; Refill: 0  - benzonatate (TESSALON) 100 MG Cap; Take 1 Cap by mouth 3 times a day as needed for Cough.  Dispense: 60 Cap; Refill: 0    Differential diagnosis, natural history, supportive care, and indications for immediate follow-up discussed.

## 2019-07-10 ASSESSMENT — ENCOUNTER SYMPTOMS
HEADACHES: 1
FEVER: 0
SINUS PAIN: 1
SHORTNESS OF BREATH: 0
CHILLS: 0
COUGH: 1
WHEEZING: 0

## 2019-07-29 ENCOUNTER — HOSPITAL ENCOUNTER (OUTPATIENT)
Dept: RADIOLOGY | Facility: MEDICAL CENTER | Age: 59
End: 2019-07-29
Attending: STUDENT IN AN ORGANIZED HEALTH CARE EDUCATION/TRAINING PROGRAM
Payer: COMMERCIAL

## 2019-07-29 DIAGNOSIS — Z12.39 SCREENING FOR BREAST CANCER: ICD-10-CM

## 2019-07-29 DIAGNOSIS — Z80.3 FAMILY HISTORY OF BREAST CANCER: ICD-10-CM

## 2019-07-29 DIAGNOSIS — N60.02 CYST OF LEFT BREAST: ICD-10-CM

## 2019-07-29 PROCEDURE — 76641 ULTRASOUND BREAST COMPLETE: CPT

## 2019-07-30 ENCOUNTER — HOSPITAL ENCOUNTER (OUTPATIENT)
Dept: RADIOLOGY | Facility: MEDICAL CENTER | Age: 59
End: 2019-07-30
Attending: STUDENT IN AN ORGANIZED HEALTH CARE EDUCATION/TRAINING PROGRAM
Payer: COMMERCIAL

## 2019-07-30 DIAGNOSIS — Z12.39 SCREENING FOR BREAST CANCER: ICD-10-CM

## 2019-07-30 PROCEDURE — G0279 TOMOSYNTHESIS, MAMMO: HCPCS

## 2019-07-30 PROCEDURE — 76642 ULTRASOUND BREAST LIMITED: CPT | Mod: LT

## 2019-09-06 ENCOUNTER — HOSPITAL ENCOUNTER (OUTPATIENT)
Dept: RADIOLOGY | Facility: MEDICAL CENTER | Age: 59
End: 2019-09-06
Attending: INTERNAL MEDICINE
Payer: COMMERCIAL

## 2019-09-06 DIAGNOSIS — R91.8 PULMONARY NODULES: ICD-10-CM

## 2019-09-06 PROCEDURE — 71250 CT THORAX DX C-: CPT

## 2019-09-17 ENCOUNTER — OFFICE VISIT (OUTPATIENT)
Dept: PULMONOLOGY | Facility: HOSPICE | Age: 59
End: 2019-09-17
Payer: COMMERCIAL

## 2019-09-17 VITALS
RESPIRATION RATE: 16 BRPM | HEART RATE: 86 BPM | WEIGHT: 183 LBS | SYSTOLIC BLOOD PRESSURE: 122 MMHG | OXYGEN SATURATION: 96 % | HEIGHT: 63 IN | DIASTOLIC BLOOD PRESSURE: 80 MMHG | BODY MASS INDEX: 32.43 KG/M2

## 2019-09-17 DIAGNOSIS — Z12.2 ENCOUNTER FOR SCREENING FOR MALIGNANT NEOPLASM OF RESPIRATORY ORGANS: ICD-10-CM

## 2019-09-17 DIAGNOSIS — Z23 NEED FOR VACCINATION: ICD-10-CM

## 2019-09-17 DIAGNOSIS — R91.8 PULMONARY NODULES: ICD-10-CM

## 2019-09-17 DIAGNOSIS — Z72.0 TOBACCO ABUSE: ICD-10-CM

## 2019-09-17 PROCEDURE — 99214 OFFICE O/P EST MOD 30 MIN: CPT | Mod: 25 | Performed by: INTERNAL MEDICINE

## 2019-09-17 PROCEDURE — 90732 PPSV23 VACC 2 YRS+ SUBQ/IM: CPT | Performed by: INTERNAL MEDICINE

## 2019-09-17 PROCEDURE — 90471 IMMUNIZATION ADMIN: CPT | Performed by: INTERNAL MEDICINE

## 2019-09-17 RX ORDER — CHLORTHALIDONE 25 MG/1
TABLET ORAL
COMMUNITY
Start: 2019-06-03 | End: 2022-01-14 | Stop reason: SDUPTHER

## 2019-09-17 RX ORDER — ASPIRIN 325 MG
TABLET ORAL
COMMUNITY

## 2019-09-17 NOTE — PATIENT INSTRUCTIONS
Tobacco Use Disorder  Tobacco use disorder (TUD) is a mental disorder. It is the long-term use of tobacco in spite of related health problems or difficulty with normal life activities. Tobacco is most commonly smoked as cigarettes and less commonly as cigars or pipes. Smokeless chewing tobacco and snuff are also popular. People with TUD get a feeling of extreme pleasure (euphoria) from using tobacco and have a desire to use it again and again. Repeated use of tobacco can cause problems.  The addictive effects of tobacco are due mainly to the ingredient nicotine. Nicotine also causes a rush of adrenaline (epinephrine) in the body. This leads to increased blood pressure, heart rate, and breathing rate. These changes may cause problems for people with high blood pressure, weak hearts, or lung disease. High doses of nicotine in children and pets can lead to seizures and death.  Tobacco contains a number of other unsafe chemicals. These chemicals are especially harmful when inhaled as smoke and can damage almost every organ in the body. Smokers live shorter lives than nonsmokers and are at risk of dying from a number of diseases and cancers. Tobacco smoke can also cause health problems for nonsmokers (due to inhaling secondhand smoke). Smoking is also a fire hazard.  TUD usually starts in the late teenage years and is most common in young adults between the ages of 18 and 25 years. People who start smoking earlier in life are more likely to continue smoking as adults. TUD is somewhat more common in men than women. People with TUD are at higher risk for using alcohol and other drugs of abuse.  What increases the risk?  Risk factors for TUD include:  · Having family members with the disorder.  · Being around people who use tobacco.  · Having an existing mental health issue such as schizophrenia, depression, bipolar disorder, ADHD, or posttraumatic stress disorder (PTSD).  What are the signs or symptoms?  People with  tobacco use disorder have two or more of the following signs and symptoms within 12 months:  · Use of more tobacco over a longer period than intended.  · Not able to cut down or control tobacco use.  · A lot of time spent obtaining or using tobacco.  · Strong desire or urge to use tobacco (craving). Cravings may last for 6 months or longer after quitting.  · Use of tobacco even when use leads to major problems at work, school, or home.  · Use of tobacco even when use leads to relationship problems.  · Giving up or cutting down on important life activities because of tobacco use.  · Repeatedly using tobacco in situations where it puts you or others in physical danger, like smoking in bed.  · Use of tobacco even when it is known that a physical or mental problem is likely related to tobacco use.  ¨ Physical problems are numerous and may include chronic bronchitis, emphysema, lung and other cancers, gum disease, high blood pressure, heart disease, and stroke.  ¨ Mental problems caused by tobacco may include difficulty sleeping and anxiety.  · Need to use greater amounts of tobacco to get the same effect. This means you have developed a tolerance.  · Withdrawal symptoms as a result of stopping or rapidly cutting back use. These symptoms may last a month or more after quitting and include the following:  ¨ Depressed, anxious, or irritable mood.  ¨ Difficulty concentrating.  ¨ Increased appetite.  ¨ Restlessness or trouble sleeping.  ¨ Use of tobacco to avoid withdrawal symptoms.  How is this diagnosed?  Tobacco use disorder is diagnosed by your health care provider. A diagnosis may be made by:  · Your health care provider asking questions about your tobacco use and any problems it may be causing.  · A physical exam.  · Lab tests.  · You may be referred to a mental health professional or addiction specialist.  The severity of tobacco use disorder depends on the number of signs and symptoms you have:  · Mild--Two or three  symptoms.  · Moderate--Four or five symptoms.  · Severe--Six or more symptoms.  How is this treated?  Many people with tobacco use disorder are unable to quit on their own and need help. Treatment options include the following:  · Nicotine replacement therapy (NRT). NRT provides nicotine without the other harmful chemicals in tobacco. NRT gradually lowers the dosage of nicotine in the body and reduces withdrawal symptoms. NRT is available in over-the-counter forms (gum, lozenges, and skin patches) as well as prescription forms (mouth inhaler and nasal spray).  · Medicines. This may include:  ¨ Antidepressant medicine that may reduce nicotine cravings.  ¨ A medicine that acts on nicotine receptors in the brain to reduce cravings and withdrawal symptoms. It may also block the effects of tobacco in people with TUD who relapse.  · Counseling or talk therapy. A form of talk therapy called behavioral therapy is commonly used to treat people with TUD. Behavioral therapy looks at triggers for tobacco use, how to avoid them, and how to cope with cravings. It is most effective in person or by phone but is also available in self-help forms (books and Internet websites).  · Support groups. These provide emotional support, advice, and guidance for quitting tobacco.  The most effective treatment for TUD is usually a combination of medicine, talk therapy, and support groups.  Follow these instructions at home:  · Keep all follow-up visits as directed by your health care provider. This is important.  · Take medicines only as directed by your health care provider.  · Check with your health care provider before starting new prescription or over-the-counter medicines.  Contact a health care provider if:  · You are not able to take your medicines as prescribed.  · Treatment is not helping your TUD and your symptoms get worse.  Get help right away if:  · You have serious thoughts about hurting yourself or others.  · You have trouble  breathing, chest pain, sudden weakness, or sudden numbness in part of your body.  This information is not intended to replace advice given to you by your health care provider. Make sure you discuss any questions you have with your health care provider.  Document Released: 08/23/2005 Document Revised: 08/20/2017 Document Reviewed: 02/13/2015  [employer] Patient Education © 2017 [employer]

## 2019-09-17 NOTE — PROGRESS NOTES
Chief Complaint   Patient presents with   • Follow-Up     Pulmonary nodules   • Results     CT 09/06/2019       HPI: This patient is a 59 y.o. Female who returns to lung nodule clinic from Lung Cancer Screening program.  The patient is an active smoker with estimated 60 pack years to date.  She is interested in quitting.   Her daughter completed chemotherapy for breast cancer in the past year and she had 6 family deaths.  She is hopeful that coming year will be better and she can focus on quitting smoking. She denies shortness of breath, chest pain, hemoptysis or weight loss.  Low-dose lung cancer screening chest CAT scan on September 17, 2018 was reviewed and showed numerous, <6mm pulmonary nodules scattered bilaterally in addition to mild emphysema.  Her 6-month follow-up chest CAT scan on March 8, 2019 and annual follow-up on August 6, 2019 showed stable nodules.    Past Medical History:   Diagnosis Date   • Anxiety    • Back pain    • Chickenpox    • Depression    • Citizen of [place] measles    • Head ache    • Hypertension    • Influenza    • Pulmonary nodule    • Shingles        Social History     Socioeconomic History   • Marital status:      Spouse name: Not on file   • Number of children: Not on file   • Years of education: Not on file   • Highest education level: Not on file   Occupational History   • Not on file   Social Needs   • Financial resource strain: Not on file   • Food insecurity:     Worry: Not on file     Inability: Not on file   • Transportation needs:     Medical: Not on file     Non-medical: Not on file   Tobacco Use   • Smoking status: Current Every Day Smoker     Packs/day: 0.50     Years: 40.00     Pack years: 20.00     Types: Cigarettes   • Smokeless tobacco: Never Used   • Tobacco comment: 13y @ 3ppd, quit x 2, then 1ppd (avd 1.5 ppd)   Substance and Sexual Activity   • Alcohol use: No     Comment: once or twice a year    • Drug use: No   • Sexual activity: Yes     Partners: Male    Lifestyle   • Physical activity:     Days per week: Not on file     Minutes per session: Not on file   • Stress: Not on file   Relationships   • Social connections:     Talks on phone: Not on file     Gets together: Not on file     Attends Spiritism service: Not on file     Active member of club or organization: Not on file     Attends meetings of clubs or organizations: Not on file     Relationship status: Not on file   • Intimate partner violence:     Fear of current or ex partner: Not on file     Emotionally abused: Not on file     Physically abused: Not on file     Forced sexual activity: Not on file   Other Topics Concern   • Not on file   Social History Narrative   • Not on file       Family History   Problem Relation Age of Onset   • Lung Disease Mother    • Cancer Maternal Grandmother         Lung   • Breast Cancer Daughter        Current Outpatient Medications on File Prior to Visit   Medication Sig Dispense Refill   • chlorthalidone (HYGROTON) 25 MG Tab CHLORTHALIDONE 25 MG TABS     • chlorthalidone (HYGROTON) 25 MG Tab Take 1 Tab by mouth every day. 90 Tab 0   • B Complex Vitamins (VITAMIN B COMPLEX) CAPS Take 1 Cap by mouth every day.       • aspirin (ASA) 325 MG TABS Take 650 mg by mouth 2 times a day as needed.     • aspirin (ASA) 325 MG Tab ASPIRIN 325 MG TABS     • B COMPLEX VITAMINS ER PO B Complex Vitamins (VITAMIN B COMPLEX) CAPS     • B COMPLEX VITAMINS PO B Complex Vitamins (VITAMIN B COMPLEX) CAPS     • benzonatate (TESSALON) 100 MG Cap Take 1 Cap by mouth 3 times a day as needed for Cough. 60 Cap 0   • nicotine (NICODERM) 7 MG/24HR PATCH 24 HR Apply 1 Patch to skin as directed every 24 hours. 30 Patch 0     No current facility-administered medications on file prior to visit.        Allergies: Nkda [no known drug allergy]    ROS:   Constitutional: Denies fevers, chills, night sweats, fatigue or weight loss  Eyes: Denies vision loss, pain, drainage, double vision  Ears, Nose, Throat: Denies  "earache, difficulty hearing, tinnitus, nasal congestion, hoarseness  Cardiovascular: Denies chest pain, tightness, palpitations, orthopnea or edema  Respiratory: As in HPI  Sleep: Denies daytime sleepiness, snoring, apneas, insomnia, morning headaches  GI: Denies heartburn, dysphagia, nausea, abdominal pain, diarrhea or constipation  : Denies frequent urination, hematuria, discharge or painful urination  Musculoskeletal: Denies back pain, painful joints, sore muscles  Neurological: Denies weakness or headaches  Skin: No rashes    /80 (BP Location: Left arm, Patient Position: Sitting, BP Cuff Size: Adult)   Pulse 86   Resp 16   Ht 1.6 m (5' 3\")   Wt 83 kg (183 lb)   SpO2 96%     Physical Exam:  Appearance: Well-nourished, well-developed, in no acute distress  HEENT: Normocephalic, atraumatic, white sclera, PERRLA, oropharynx clear  Neck: No adenopathy or masses  Respiratory: no intercostal retractions or accessory muscle use  Lungs auscultation: Clear to auscultation bilaterally  Cardiovascular: Regular rate rhythm. No murmurs, rubs or gallops.  No LE edema  Abdomen: soft, nondistended  Gait: Normal  Digits: No clubbing, cyanosis  Motor: No focal deficits  Orientation: Oriented to time, person and place    Diagnosis:  1. Pulmonary nodules     2. Tobacco abuse     3. Need for vaccination  Pneumoccocal Polysaccharide Vaccine 23-Valent =>1yo SQ/IM   4. Encounter for screening for malignant neoplasm of respiratory organs  CT-LUNG CANCER-SCREENING       Plan:  The patient's scattered, pulmonary micronodules show stability on one year follow-up chest CAT scan.  She is asymptomatic.  She is an active smoker.  The nodules are too small for biopsy or PET resolution.  Smoking cessation counseling provided.    Recommend surveillance low-dose lung cancer screening chest CAT scan annually.  Return in about 1 year (around 9/17/2020) for at lung nodule clinic.      "

## 2019-10-09 ENCOUNTER — APPOINTMENT (RX ONLY)
Dept: URBAN - METROPOLITAN AREA CLINIC 20 | Facility: CLINIC | Age: 59
Setting detail: DERMATOLOGY
End: 2019-10-09

## 2019-10-09 DIAGNOSIS — Z71.89 OTHER SPECIFIED COUNSELING: ICD-10-CM

## 2019-10-09 DIAGNOSIS — L81.4 OTHER MELANIN HYPERPIGMENTATION: ICD-10-CM

## 2019-10-09 DIAGNOSIS — D22 MELANOCYTIC NEVI: ICD-10-CM

## 2019-10-09 DIAGNOSIS — L82.1 OTHER SEBORRHEIC KERATOSIS: ICD-10-CM

## 2019-10-09 DIAGNOSIS — D18.0 HEMANGIOMA: ICD-10-CM

## 2019-10-09 PROBLEM — D22.39 MELANOCYTIC NEVI OF OTHER PARTS OF FACE: Status: ACTIVE | Noted: 2019-10-09

## 2019-10-09 PROBLEM — D22.61 MELANOCYTIC NEVI OF RIGHT UPPER LIMB, INCLUDING SHOULDER: Status: ACTIVE | Noted: 2019-10-09

## 2019-10-09 PROBLEM — D22.71 MELANOCYTIC NEVI OF RIGHT LOWER LIMB, INCLUDING HIP: Status: ACTIVE | Noted: 2019-10-09

## 2019-10-09 PROBLEM — D22.5 MELANOCYTIC NEVI OF TRUNK: Status: ACTIVE | Noted: 2019-10-09

## 2019-10-09 PROBLEM — D18.01 HEMANGIOMA OF SKIN AND SUBCUTANEOUS TISSUE: Status: ACTIVE | Noted: 2019-10-09

## 2019-10-09 PROBLEM — D48.5 NEOPLASM OF UNCERTAIN BEHAVIOR OF SKIN: Status: ACTIVE | Noted: 2019-10-09

## 2019-10-09 PROBLEM — D22.62 MELANOCYTIC NEVI OF LEFT UPPER LIMB, INCLUDING SHOULDER: Status: ACTIVE | Noted: 2019-10-09

## 2019-10-09 PROBLEM — D22.72 MELANOCYTIC NEVI OF LEFT LOWER LIMB, INCLUDING HIP: Status: ACTIVE | Noted: 2019-10-09

## 2019-10-09 PROCEDURE — 99203 OFFICE O/P NEW LOW 30 MIN: CPT | Mod: 25

## 2019-10-09 PROCEDURE — ? COUNSELING

## 2019-10-09 PROCEDURE — ? SUNSCREEN RECOMMENDATIONS

## 2019-10-09 PROCEDURE — 11102 TANGNTL BX SKIN SINGLE LES: CPT

## 2019-10-09 PROCEDURE — ? BIOPSY BY SHAVE METHOD

## 2019-10-09 ASSESSMENT — LOCATION SIMPLE DESCRIPTION DERM
LOCATION SIMPLE: RIGHT CHEEK
LOCATION SIMPLE: RIGHT PRETIBIAL REGION
LOCATION SIMPLE: RIGHT CHEEK
LOCATION SIMPLE: LEFT POSTERIOR THIGH
LOCATION SIMPLE: RIGHT UPPER BACK
LOCATION SIMPLE: LEFT PRETIBIAL REGION
LOCATION SIMPLE: RIGHT FOREARM
LOCATION SIMPLE: RIGHT POSTERIOR THIGH
LOCATION SIMPLE: LEFT POSTERIOR UPPER ARM
LOCATION SIMPLE: RIGHT LOWER BACK
LOCATION SIMPLE: LEFT FOREARM
LOCATION SIMPLE: RIGHT POSTERIOR UPPER ARM
LOCATION SIMPLE: RIGHT FOREHEAD
LOCATION SIMPLE: UPPER BACK

## 2019-10-09 ASSESSMENT — LOCATION DETAILED DESCRIPTION DERM
LOCATION DETAILED: RIGHT INFERIOR MEDIAL UPPER BACK
LOCATION DETAILED: LEFT PROXIMAL POSTERIOR UPPER ARM
LOCATION DETAILED: RIGHT VENTRAL PROXIMAL FOREARM
LOCATION DETAILED: INFERIOR THORACIC SPINE
LOCATION DETAILED: RIGHT MEDIAL MALAR CHEEK
LOCATION DETAILED: RIGHT INFERIOR MEDIAL MIDBACK
LOCATION DETAILED: RIGHT MEDIAL MALAR CHEEK
LOCATION DETAILED: LEFT VENTRAL PROXIMAL FOREARM
LOCATION DETAILED: RIGHT INFERIOR CENTRAL MALAR CHEEK
LOCATION DETAILED: RIGHT DISTAL POSTERIOR UPPER ARM
LOCATION DETAILED: RIGHT INFERIOR FOREHEAD
LOCATION DETAILED: LEFT DISTAL POSTERIOR THIGH
LOCATION DETAILED: RIGHT PROXIMAL PRETIBIAL REGION
LOCATION DETAILED: RIGHT SUPERIOR UPPER BACK
LOCATION DETAILED: RIGHT DISTAL POSTERIOR THIGH
LOCATION DETAILED: LEFT LATERAL PROXIMAL PRETIBIAL REGION

## 2019-10-09 ASSESSMENT — LOCATION ZONE DERM
LOCATION ZONE: TRUNK
LOCATION ZONE: FACE
LOCATION ZONE: FACE
LOCATION ZONE: ARM
LOCATION ZONE: LEG

## 2019-10-09 NOTE — PROCEDURE: BIOPSY BY SHAVE METHOD
Billing Type: Third-Party Bill
Detail Level: Detailed
Biopsy Method: Personna blade
Was A Bandage Applied: Yes
Render Path Notes In Note?: No
Post-Care Instructions: I reviewed with the patient in detail post-care instructions. Patient is to keep the biopsy site clean once daily and then apply petroleum and bandaid  until healed.
Lab: [id number]
Anesthesia Type: 1% lidocaine with 1:100,000 epinephrine and a 1:12 solution of 8.4% sodium bicarbonate
Additional Anesthesia Volume In Cc (Will Not Render If 0): 0
Notification Instructions: Patient will be notified of biopsy results. However, patient instructed to call the office if not contacted within 2 weeks.
Wound Care: Bacitracin
Lab Facility: 
Depth Of Biopsy: dermis
Consent: Written consent was obtained and risks were reviewed including but not limited to scarring, infection, bleeding, scabbing, incomplete removal, nerve damage and allergy to anesthesia.
Anesthesia Volume In Cc: 1
Type Of Destruction Used: Curettage
Biopsy Type: H and E
Dressing: Band-Aid
Hemostasis: Drysol and Electrocautery

## 2020-09-04 ENCOUNTER — HOSPITAL ENCOUNTER (OUTPATIENT)
Dept: RADIOLOGY | Facility: MEDICAL CENTER | Age: 60
End: 2020-09-04
Attending: INTERNAL MEDICINE
Payer: COMMERCIAL

## 2020-09-04 DIAGNOSIS — Z12.2 ENCOUNTER FOR SCREENING FOR MALIGNANT NEOPLASM OF RESPIRATORY ORGANS: ICD-10-CM

## 2020-09-04 PROCEDURE — G0297 LDCT FOR LUNG CA SCREEN: HCPCS

## 2020-09-15 ENCOUNTER — OFFICE VISIT (OUTPATIENT)
Dept: PULMONOLOGY | Facility: HOSPICE | Age: 60
End: 2020-09-15
Payer: COMMERCIAL

## 2020-09-15 VITALS
BODY MASS INDEX: 35.73 KG/M2 | HEIGHT: 60 IN | DIASTOLIC BLOOD PRESSURE: 82 MMHG | OXYGEN SATURATION: 97 % | RESPIRATION RATE: 16 BRPM | HEART RATE: 97 BPM | SYSTOLIC BLOOD PRESSURE: 130 MMHG | WEIGHT: 182 LBS

## 2020-09-15 DIAGNOSIS — Z72.0 TOBACCO ABUSE: ICD-10-CM

## 2020-09-15 DIAGNOSIS — Z12.2 ENCOUNTER FOR SCREENING FOR MALIGNANT NEOPLASM OF RESPIRATORY ORGANS: ICD-10-CM

## 2020-09-15 DIAGNOSIS — R91.8 PULMONARY NODULES: ICD-10-CM

## 2020-09-15 PROCEDURE — 99214 OFFICE O/P EST MOD 30 MIN: CPT | Performed by: INTERNAL MEDICINE

## 2020-09-15 NOTE — PROGRESS NOTES
Chief Complaint   Patient presents with   • Pulmonary Nodule     Last Seen 09/17/19   • Results     Chest CT 09/04/20     HPI: This patient is a 60 y.o. Female who returns to lung nodule clinic from Lung Cancer Screening program.  The patient is an active smoker with estimated 60 pack years to date.  She is interested in quitting. She denies shortness of breath, chest pain, hemoptysis or weight loss.  Low-dose lung cancer screening chest CAT scan on September 17, 2018 was reviewed and showed nmultiple, <6mm pulmonary nodules scattered bilaterally in addition to mild emphysema.  Her 6-month follow-up chest CAT scan on March 8, 2019 and annual follow-up on August 6, 2019 and September 4, 2020 showed stable nodules. No new nodules identified.  Her most recent scan also identified lingular atelectasis.      Past Medical History:   Diagnosis Date   • Anxiety    • Back pain    • Chickenpox    • Depression    • [place] measles    • Head ache    • Hypertension    • Influenza    • Pulmonary nodule    • Shingles        Social History     Socioeconomic History   • Marital status:      Spouse name: Not on file   • Number of children: Not on file   • Years of education: Not on file   • Highest education level: Not on file   Occupational History   • Not on file   Social Needs   • Financial resource strain: Not on file   • Food insecurity     Worry: Not on file     Inability: Not on file   • Transportation needs     Medical: Not on file     Non-medical: Not on file   Tobacco Use   • Smoking status: Current Every Day Smoker     Packs/day: 0.50     Years: 40.00     Pack years: 20.00     Types: Cigarettes   • Smokeless tobacco: Never Used   • Tobacco comment: 15y @ 3ppd, quit x 2, then 1ppd (avd 1.5 ppd)   Substance and Sexual Activity   • Alcohol use: No     Comment: once or twice a year    • Drug use: No   • Sexual activity: Yes     Partners: Male   Lifestyle   • Physical activity     Days per week: Not on file     Minutes  per session: Not on file   • Stress: Not on file   Relationships   • Social connections     Talks on phone: Not on file     Gets together: Not on file     Attends Lutheran service: Not on file     Active member of club or organization: Not on file     Attends meetings of clubs or organizations: Not on file     Relationship status: Not on file   • Intimate partner violence     Fear of current or ex partner: Not on file     Emotionally abused: Not on file     Physically abused: Not on file     Forced sexual activity: Not on file   Other Topics Concern   • Not on file   Social History Narrative   • Not on file       Family History   Problem Relation Age of Onset   • Lung Disease Mother    • Cancer Maternal Grandmother         Lung   • Breast Cancer Daughter        Current Outpatient Medications on File Prior to Visit   Medication Sig Dispense Refill   • aspirin (ASA) 325 MG Tab ASPIRIN 325 MG TABS     • B COMPLEX VITAMINS ER PO B Complex Vitamins (VITAMIN B COMPLEX) CAPS     • B COMPLEX VITAMINS PO B Complex Vitamins (VITAMIN B COMPLEX) CAPS     • chlorthalidone (HYGROTON) 25 MG Tab CHLORTHALIDONE 25 MG TABS     • chlorthalidone (HYGROTON) 25 MG Tab Take 1 Tab by mouth every day. 90 Tab 0   • B Complex Vitamins (VITAMIN B COMPLEX) CAPS Take 1 Cap by mouth every day.       • aspirin (ASA) 325 MG TABS Take 650 mg by mouth 2 times a day as needed.     • benzonatate (TESSALON) 100 MG Cap Take 1 Cap by mouth 3 times a day as needed for Cough. 60 Cap 0   • nicotine (NICODERM) 7 MG/24HR PATCH 24 HR Apply 1 Patch to skin as directed every 24 hours. 30 Patch 0     No current facility-administered medications on file prior to visit.        Allergies: Nkda [no known drug allergy]    ROS:   Constitutional: Denies fevers, chills, night sweats, fatigue or weight loss  Eyes: Denies vision loss, pain, drainage, double vision  Ears, Nose, Throat: Denies earache, difficulty hearing, tinnitus, nasal congestion,  hoarseness  Cardiovascular: Denies chest pain, tightness, palpitations, orthopnea or edema  Respiratory: Denies shortness of breath, cough, wheezing, hemoptysis  Sleep: Denies daytime sleepiness, snoring, apneas, insomnia, morning headaches  GI: Denies heartburn, dysphagia, nausea, abdominal pain, diarrhea or constipation  : Denies frequent urination, hematuria, discharge or painful urination  Musculoskeletal: Denies back pain, painful joints, sore muscles  Neurological: Denies weakness or headaches  Skin: No rashes    /82 (BP Location: Right arm, Patient Position: Sitting, BP Cuff Size: Adult)   Pulse 97   Resp 16   Ht 1.524 m (5')   Wt 82.6 kg (182 lb)   SpO2 97%     Physical Exam:  Appearance: Well-nourished, well-developed, in no acute distress  HEENT: Normocephalic, atraumatic, white sclera, PERRLA, masked  Neck: No adenopathy or masses  Respiratory: no intercostal retractions or accessory muscle use  Lungs auscultation: Clear to auscultation bilaterally  Cardiovascular: Regular rate rhythm. No murmurs, rubs or gallops.  No LE edema  Abdomen: soft, nondistended  Gait: Normal  Digits: No clubbing, cyanosis  Motor: No focal deficits  Orientation: Oriented to time, person and place    Diagnosis:  1. Pulmonary nodules  CT-CHEST (THORAX) W/O   2. Tobacco abuse         Plan:  The patient's scattered, pulmonary micronodules show stability since 2018.   She is asymptomatic.  She is an active smoker.  The nodules are too small for biopsy or PET resolution. Lingular atelectasis is of clinical insignificance.  Smoking cessation counseling provided.    Recommend surveillance low-dose lung cancer screening chest CAT scan annually.  Return in about 1 year (around 9/15/2021) for after CT scan, at lung nodule clinic.

## 2020-10-09 ENCOUNTER — APPOINTMENT (RX ONLY)
Dept: URBAN - METROPOLITAN AREA CLINIC 20 | Facility: CLINIC | Age: 60
Setting detail: DERMATOLOGY
End: 2020-10-09

## 2020-10-09 DIAGNOSIS — Z71.89 OTHER SPECIFIED COUNSELING: ICD-10-CM

## 2020-10-09 DIAGNOSIS — L81.4 OTHER MELANIN HYPERPIGMENTATION: ICD-10-CM

## 2020-10-09 DIAGNOSIS — D18.0 HEMANGIOMA: ICD-10-CM

## 2020-10-09 DIAGNOSIS — D22 MELANOCYTIC NEVI: ICD-10-CM

## 2020-10-09 DIAGNOSIS — L82.1 OTHER SEBORRHEIC KERATOSIS: ICD-10-CM

## 2020-10-09 PROBLEM — D22.5 MELANOCYTIC NEVI OF TRUNK: Status: ACTIVE | Noted: 2020-10-09

## 2020-10-09 PROBLEM — D22.71 MELANOCYTIC NEVI OF RIGHT LOWER LIMB, INCLUDING HIP: Status: ACTIVE | Noted: 2020-10-09

## 2020-10-09 PROBLEM — D22.72 MELANOCYTIC NEVI OF LEFT LOWER LIMB, INCLUDING HIP: Status: ACTIVE | Noted: 2020-10-09

## 2020-10-09 PROBLEM — D22.39 MELANOCYTIC NEVI OF OTHER PARTS OF FACE: Status: ACTIVE | Noted: 2020-10-09

## 2020-10-09 PROBLEM — D18.01 HEMANGIOMA OF SKIN AND SUBCUTANEOUS TISSUE: Status: ACTIVE | Noted: 2020-10-09

## 2020-10-09 PROBLEM — D22.62 MELANOCYTIC NEVI OF LEFT UPPER LIMB, INCLUDING SHOULDER: Status: ACTIVE | Noted: 2020-10-09

## 2020-10-09 PROBLEM — D22.61 MELANOCYTIC NEVI OF RIGHT UPPER LIMB, INCLUDING SHOULDER: Status: ACTIVE | Noted: 2020-10-09

## 2020-10-09 PROCEDURE — ? SUNSCREEN RECOMMENDATIONS

## 2020-10-09 PROCEDURE — 99214 OFFICE O/P EST MOD 30 MIN: CPT

## 2020-10-09 PROCEDURE — ? COUNSELING

## 2020-10-09 ASSESSMENT — LOCATION DETAILED DESCRIPTION DERM
LOCATION DETAILED: RIGHT DISTAL POSTERIOR UPPER ARM
LOCATION DETAILED: RIGHT PROXIMAL PRETIBIAL REGION
LOCATION DETAILED: LEFT LATERAL PROXIMAL PRETIBIAL REGION
LOCATION DETAILED: RIGHT INFERIOR MEDIAL MIDBACK
LOCATION DETAILED: RIGHT INFERIOR MEDIAL UPPER BACK
LOCATION DETAILED: LEFT VENTRAL PROXIMAL FOREARM
LOCATION DETAILED: INFERIOR THORACIC SPINE
LOCATION DETAILED: RIGHT VENTRAL PROXIMAL FOREARM
LOCATION DETAILED: RIGHT SUPERIOR UPPER BACK
LOCATION DETAILED: LEFT PROXIMAL POSTERIOR UPPER ARM
LOCATION DETAILED: RIGHT INFERIOR CENTRAL MALAR CHEEK
LOCATION DETAILED: LEFT DISTAL POSTERIOR THIGH
LOCATION DETAILED: RIGHT INFERIOR FOREHEAD
LOCATION DETAILED: RIGHT DISTAL POSTERIOR THIGH

## 2020-10-09 ASSESSMENT — LOCATION SIMPLE DESCRIPTION DERM
LOCATION SIMPLE: RIGHT POSTERIOR THIGH
LOCATION SIMPLE: UPPER BACK
LOCATION SIMPLE: RIGHT CHEEK
LOCATION SIMPLE: LEFT FOREARM
LOCATION SIMPLE: RIGHT FOREARM
LOCATION SIMPLE: RIGHT POSTERIOR UPPER ARM
LOCATION SIMPLE: RIGHT FOREHEAD
LOCATION SIMPLE: LEFT PRETIBIAL REGION
LOCATION SIMPLE: RIGHT LOWER BACK
LOCATION SIMPLE: LEFT POSTERIOR THIGH
LOCATION SIMPLE: RIGHT UPPER BACK
LOCATION SIMPLE: LEFT POSTERIOR UPPER ARM
LOCATION SIMPLE: RIGHT PRETIBIAL REGION

## 2020-10-09 ASSESSMENT — LOCATION ZONE DERM
LOCATION ZONE: LEG
LOCATION ZONE: ARM
LOCATION ZONE: TRUNK
LOCATION ZONE: FACE

## 2021-01-22 ENCOUNTER — HOSPITAL ENCOUNTER (OUTPATIENT)
Dept: RADIOLOGY | Facility: MEDICAL CENTER | Age: 61
End: 2021-01-22
Attending: STUDENT IN AN ORGANIZED HEALTH CARE EDUCATION/TRAINING PROGRAM
Payer: COMMERCIAL

## 2021-01-22 DIAGNOSIS — R92.8 ABNORMAL MAMMOGRAM: ICD-10-CM

## 2021-01-22 DIAGNOSIS — Z80.3 FAMILY HISTORY OF BREAST CANCER: ICD-10-CM

## 2021-01-22 PROCEDURE — 76642 ULTRASOUND BREAST LIMITED: CPT | Mod: LT

## 2021-01-22 PROCEDURE — G0279 TOMOSYNTHESIS, MAMMO: HCPCS

## 2021-03-15 DIAGNOSIS — Z23 NEED FOR VACCINATION: ICD-10-CM

## 2021-04-15 ENCOUNTER — HOSPITAL ENCOUNTER (OUTPATIENT)
Dept: LAB | Facility: MEDICAL CENTER | Age: 61
End: 2021-04-15
Attending: STUDENT IN AN ORGANIZED HEALTH CARE EDUCATION/TRAINING PROGRAM
Payer: COMMERCIAL

## 2021-04-15 LAB
ALBUMIN SERPL BCP-MCNC: 4.8 G/DL (ref 3.2–4.9)
ALBUMIN/GLOB SERPL: 1.6 G/DL
ALP SERPL-CCNC: 117 U/L (ref 30–99)
ALT SERPL-CCNC: 23 U/L (ref 2–50)
ANION GAP SERPL CALC-SCNC: 8 MMOL/L (ref 7–16)
AST SERPL-CCNC: 22 U/L (ref 12–45)
BASOPHILS # BLD AUTO: 0.8 % (ref 0–1.8)
BASOPHILS # BLD: 0.08 K/UL (ref 0–0.12)
BILIRUB SERPL-MCNC: 0.4 MG/DL (ref 0.1–1.5)
BUN SERPL-MCNC: 21 MG/DL (ref 8–22)
CALCIUM SERPL-MCNC: 9.9 MG/DL (ref 8.5–10.5)
CHLORIDE SERPL-SCNC: 100 MMOL/L (ref 96–112)
CHOLEST SERPL-MCNC: 231 MG/DL (ref 100–199)
CO2 SERPL-SCNC: 30 MMOL/L (ref 20–33)
CREAT SERPL-MCNC: 0.81 MG/DL (ref 0.5–1.4)
CREAT UR-MCNC: 39.06 MG/DL
EOSINOPHIL # BLD AUTO: 0.16 K/UL (ref 0–0.51)
EOSINOPHIL NFR BLD: 1.7 % (ref 0–6.9)
ERYTHROCYTE [DISTWIDTH] IN BLOOD BY AUTOMATED COUNT: 47.2 FL (ref 35.9–50)
GLOBULIN SER CALC-MCNC: 3 G/DL (ref 1.9–3.5)
GLUCOSE SERPL-MCNC: 95 MG/DL (ref 65–99)
HCT VFR BLD AUTO: 49.7 % (ref 37–47)
HDLC SERPL-MCNC: 60 MG/DL
HGB BLD-MCNC: 16.5 G/DL (ref 12–16)
IMM GRANULOCYTES # BLD AUTO: 0.05 K/UL (ref 0–0.11)
IMM GRANULOCYTES NFR BLD AUTO: 0.5 % (ref 0–0.9)
LDLC SERPL CALC-MCNC: 157 MG/DL
LYMPHOCYTES # BLD AUTO: 1.8 K/UL (ref 1–4.8)
LYMPHOCYTES NFR BLD: 18.9 % (ref 22–41)
MCH RBC QN AUTO: 30.6 PG (ref 27–33)
MCHC RBC AUTO-ENTMCNC: 33.2 G/DL (ref 33.6–35)
MCV RBC AUTO: 92 FL (ref 81.4–97.8)
MICROALBUMIN UR-MCNC: <1.2 MG/DL
MICROALBUMIN/CREAT UR: NORMAL MG/G (ref 0–30)
MONOCYTES # BLD AUTO: 0.71 K/UL (ref 0–0.85)
MONOCYTES NFR BLD AUTO: 7.5 % (ref 0–13.4)
NEUTROPHILS # BLD AUTO: 6.73 K/UL (ref 2–7.15)
NEUTROPHILS NFR BLD: 70.6 % (ref 44–72)
NRBC # BLD AUTO: 0 K/UL
NRBC BLD-RTO: 0 /100 WBC
PLATELET # BLD AUTO: 320 K/UL (ref 164–446)
PMV BLD AUTO: 10.2 FL (ref 9–12.9)
POTASSIUM SERPL-SCNC: 3.9 MMOL/L (ref 3.6–5.5)
PROT SERPL-MCNC: 7.8 G/DL (ref 6–8.2)
RBC # BLD AUTO: 5.4 M/UL (ref 4.2–5.4)
SODIUM SERPL-SCNC: 138 MMOL/L (ref 135–145)
TRIGL SERPL-MCNC: 72 MG/DL (ref 0–149)
WBC # BLD AUTO: 9.5 K/UL (ref 4.8–10.8)

## 2021-04-15 PROCEDURE — 80053 COMPREHEN METABOLIC PANEL: CPT

## 2021-04-15 PROCEDURE — 82043 UR ALBUMIN QUANTITATIVE: CPT

## 2021-04-15 PROCEDURE — 85025 COMPLETE CBC W/AUTO DIFF WBC: CPT

## 2021-04-15 PROCEDURE — 82570 ASSAY OF URINE CREATININE: CPT

## 2021-04-15 PROCEDURE — 82306 VITAMIN D 25 HYDROXY: CPT

## 2021-04-15 PROCEDURE — 36415 COLL VENOUS BLD VENIPUNCTURE: CPT

## 2021-04-15 PROCEDURE — 84443 ASSAY THYROID STIM HORMONE: CPT

## 2021-04-15 PROCEDURE — 80061 LIPID PANEL: CPT

## 2021-04-16 LAB — TSH SERPL DL<=0.005 MIU/L-ACNC: 1.3 UIU/ML (ref 0.38–5.33)

## 2021-04-17 LAB — 25(OH)D3 SERPL-MCNC: 42 NG/ML (ref 30–80)

## 2021-05-07 ENCOUNTER — HOSPITAL ENCOUNTER (OUTPATIENT)
Dept: LAB | Facility: MEDICAL CENTER | Age: 61
End: 2021-05-07
Attending: STUDENT IN AN ORGANIZED HEALTH CARE EDUCATION/TRAINING PROGRAM
Payer: COMMERCIAL

## 2021-05-07 LAB — GGT SERPL-CCNC: 32 U/L (ref 7–34)

## 2021-05-07 PROCEDURE — 36415 COLL VENOUS BLD VENIPUNCTURE: CPT

## 2021-05-07 PROCEDURE — 82977 ASSAY OF GGT: CPT

## 2021-09-07 ENCOUNTER — HOSPITAL ENCOUNTER (OUTPATIENT)
Dept: RADIOLOGY | Facility: MEDICAL CENTER | Age: 61
End: 2021-09-07
Attending: INTERNAL MEDICINE
Payer: COMMERCIAL

## 2021-09-07 DIAGNOSIS — Z12.2 ENCOUNTER FOR SCREENING FOR MALIGNANT NEOPLASM OF RESPIRATORY ORGANS: ICD-10-CM

## 2021-09-07 PROCEDURE — 71271 CT THORAX LUNG CANCER SCR C-: CPT

## 2021-09-10 ENCOUNTER — SLEEP CENTER VISIT (OUTPATIENT)
Dept: SLEEP MEDICINE | Facility: MEDICAL CENTER | Age: 61
End: 2021-09-10
Payer: COMMERCIAL

## 2021-09-10 VITALS
BODY MASS INDEX: 32.66 KG/M2 | WEIGHT: 177.5 LBS | SYSTOLIC BLOOD PRESSURE: 138 MMHG | RESPIRATION RATE: 16 BRPM | HEIGHT: 62 IN | DIASTOLIC BLOOD PRESSURE: 78 MMHG | HEART RATE: 84 BPM | OXYGEN SATURATION: 96 %

## 2021-09-10 DIAGNOSIS — F17.200 NICOTINE DEPENDENCE WITH CURRENT USE: ICD-10-CM

## 2021-09-10 DIAGNOSIS — F17.200 ENCOUNTER FOR SCREENING FOR MALIGNANT NEOPLASM OF LUNG IN CURRENT SMOKER WITH 30 PACK YEAR HISTORY OR GREATER: ICD-10-CM

## 2021-09-10 DIAGNOSIS — R91.8 PULMONARY NODULES: ICD-10-CM

## 2021-09-10 DIAGNOSIS — Z12.2 ENCOUNTER FOR SCREENING FOR MALIGNANT NEOPLASM OF LUNG IN CURRENT SMOKER WITH 30 PACK YEAR HISTORY OR GREATER: ICD-10-CM

## 2021-09-10 PROCEDURE — 99213 OFFICE O/P EST LOW 20 MIN: CPT | Performed by: PHYSICIAN ASSISTANT

## 2021-09-10 ASSESSMENT — ENCOUNTER SYMPTOMS
ORTHOPNEA: 0
SHORTNESS OF BREATH: 1
PALPITATIONS: 0
INSOMNIA: 1
FEVER: 0
SINUS PAIN: 0
ROS GI COMMENTS: NO DENTURES, NO DIFFICULTY SWALLOWING
CHILLS: 0
HEARTBURN: 0
SORE THROAT: 0
WEIGHT LOSS: 0
SPUTUM PRODUCTION: 1
DIZZINESS: 0
COUGH: 1
TREMORS: 0
WHEEZING: 0
HEADACHES: 0

## 2021-09-10 ASSESSMENT — FIBROSIS 4 INDEX: FIB4 SCORE: 0.85

## 2021-09-10 NOTE — PROGRESS NOTES
CC: Follow-up CT results    HPI:  Simi Massey is a 61 y.o. year old female here today for follow-up on pulmonary nodules. Last seen in clinic 9/15/2020 by Dr. Emily Camejo.  Patient is an everyday smoker with 66-pack-year history confirmed with patient.    Pertinent past medical history includes essential hypertension, sleep disordered breathing.  Benign left breast cyst, family history of breast cancer.  Previously tried nicotine patches without perceived benefit.    Reviewed in clinic vitals including 138/78, HR 84, oxygen saturation 96% on room air, BMI 32.47 kg/m2.    Reviewed home medication regimen including no maintenance or rescue inhalers, denies significant respiratory symptoms.    Reviewed most recent imaging including CT scan obtained 9/7/2021 demonstrating multiple small bilateral pulmonary nodules, measuring up to 6 mm in the right lower lobe.  Unchanged in size.  No new nodules identified.    No pulmonary function testing on file.      Review of Systems   Constitutional: Negative for chills, fever, malaise/fatigue and weight loss.   HENT: Negative for congestion, hearing loss, nosebleeds, sinus pain, sore throat and tinnitus.    Respiratory: Positive for cough, sputum production (clear) and shortness of breath (mild deconditioning ). Negative for wheezing.    Cardiovascular: Negative for chest pain, palpitations, orthopnea and leg swelling.   Gastrointestinal: Negative for heartburn.        No dentures, no difficulty swallowing    Neurological: Negative for dizziness, tremors and headaches.   Psychiatric/Behavioral: The patient has insomnia (occasional, falling asleep).        Past Medical History:   Diagnosis Date   • Anxiety    • Back pain    • Chickenpox    • Depression    • [place] measles    • Head ache    • Hypertension    • Influenza    • Pulmonary nodule    • Shingles        Past Surgical History:   Procedure Laterality Date   • APPENDECTOMY      1965   • CHOLECYSTECTOMY         Family  History   Problem Relation Age of Onset   • Lung Disease Mother    • Cancer Maternal Grandmother         Lung   • Breast Cancer Daughter        Social History     Socioeconomic History   • Marital status:      Spouse name: Not on file   • Number of children: Not on file   • Years of education: Not on file   • Highest education level: Not on file   Occupational History   • Not on file   Tobacco Use   • Smoking status: Current Every Day Smoker     Packs/day: 0.50     Years: 40.00     Pack years: 20.00     Types: Cigarettes   • Smokeless tobacco: Never Used   • Tobacco comment: 15y @ 3ppd, quit x 2, then 1ppd (avd 1.5 ppd)   Vaping Use   • Vaping Use: Never used   Substance and Sexual Activity   • Alcohol use: No     Comment: once or twice a year    • Drug use: No   • Sexual activity: Yes     Partners: Male   Other Topics Concern   • Not on file   Social History Narrative   • Not on file     Social Determinants of Health     Financial Resource Strain:    • Difficulty of Paying Living Expenses:    Food Insecurity:    • Worried About Running Out of Food in the Last Year:    • Ran Out of Food in the Last Year:    Transportation Needs:    • Lack of Transportation (Medical):    • Lack of Transportation (Non-Medical):    Physical Activity:    • Days of Exercise per Week:    • Minutes of Exercise per Session:    Stress:    • Feeling of Stress :    Social Connections:    • Frequency of Communication with Friends and Family:    • Frequency of Social Gatherings with Friends and Family:    • Attends Evangelical Services:    • Active Member of Clubs or Organizations:    • Attends Club or Organization Meetings:    • Marital Status:    Intimate Partner Violence:    • Fear of Current or Ex-Partner:    • Emotionally Abused:    • Physically Abused:    • Sexually Abused:        Allergies as of 09/10/2021 - Reviewed 09/15/2020   Allergen Reaction Noted   • Nkda [no known drug allergy]  09/07/2012        @Vital signs for this  "encounter:  Vitals:    09/10/21 0802 09/10/21 0805   Height: 1.575 m (5' 2\")    Weight: 80.5 kg (177 lb 8 oz)    Weight % change since last entry.: 0 %    BP: 138/78    Pulse: 84    BMI (Calculated): 32.47    Resp: 16    O2 sat % room air:  96 %       Current medications as of today   Current Outpatient Medications   Medication Sig Dispense Refill   • aspirin (ASA) 325 MG Tab ASPIRIN 325 MG TABS     • B COMPLEX VITAMINS ER PO B Complex Vitamins (VITAMIN B COMPLEX) CAPS     • chlorthalidone (HYGROTON) 25 MG Tab CHLORTHALIDONE 25 MG TABS     • chlorthalidone (HYGROTON) 25 MG Tab Take 1 Tab by mouth every day. 90 Tab 0   • B Complex Vitamins (VITAMIN B COMPLEX) CAPS Take 1 Cap by mouth every day.       • B COMPLEX VITAMINS PO B Complex Vitamins (VITAMIN B COMPLEX) CAPS     • benzonatate (TESSALON) 100 MG Cap Take 1 Cap by mouth 3 times a day as needed for Cough. 60 Cap 0   • nicotine (NICODERM) 7 MG/24HR PATCH 24 HR Apply 1 Patch to skin as directed every 24 hours. 30 Patch 0   • aspirin (ASA) 325 MG TABS Take 650 mg by mouth 2 times a day as needed.       No current facility-administered medications for this visit.         Physical Exam:   Gen:           Alert and oriented, No apparent distress. Mood and affect appropriate, normal interaction with provider.  Eyes:          sclere white, conjunctive moist.  Hearing:     Grossly intact.  Dentition:    Fair dentition.  Oropharynx:   Tongue normal, posterior pharynx without erythema or exudate.  Neck:        Supple, trachea midline, no masses.  Respiratory Effort: No intercostal retractions or use of accessory muscles.   Lung Auscultation:      Clear to auscultation bilaterally; no rales, rhonchi or wheezing.  CV:            Regular rate and rhythm. No edema. No murmurs, rubs or gallops.  Digits, Nails, Ext: No clubbing, cyanosis, petechiae, or nodes.   Skin:        No rashes, lesions or ulcers noted on exposed skin surfaces.                     Assessment:  1. Encounter " for screening for malignant neoplasm of lung in current smoker with 30 pack year history or greater  CT-LUNG CANCER-SCREENING   2. Pulmonary nodules     3. Nicotine dependence with current use         Immunizations:    Flu: Declines  Pneumovax 23: 9/17/2019  Prevnar 13: Deferred  Pfizer SARS-CoV-2 vaccine: 4/26/2021, 3/26/2021    Plan:    61 y.o. year old female here today for follow-up on pulmonary nodules. Last seen in clinic 9/15/2020 by Dr. Emily Camejo.  Patient is an everyday smoker with 66-pack-year history confirmed with patient.    Current smoker: Smoking cessation strongly advised.    Pertinent past medical history includes essential hypertension, sleep disordered breathing.  Benign left breast cyst, family history of breast cancer.  Previously tried nicotine patches without perceived benefit.    Reviewed in clinic vitals including 138/78, HR 84, oxygen saturation 96% on room air, BMI 32.47 kg/m2.    Pulmonary nodules: Reviewed recent chest CT, nodules stable in size, no new nodules or masses.  Continue annual screening.    Had Covid vaccine, consider booster in December.  Follow-up in 1 year to review CT results.    This dictation was created using voice recognition software. The accuracy of the dictation is limited to the abilities of the software. I expect there may be some errors of grammar and possibly content.

## 2021-09-10 NOTE — PATIENT INSTRUCTIONS
1-annual screen chest CT, unchanged  2-smoking cessation strongly advised  3-not requiring maintenance or rescue inhalers  4-had covid vaccine, consider booster in December   5-follow up appointment in one year

## 2021-10-15 ENCOUNTER — APPOINTMENT (RX ONLY)
Dept: URBAN - METROPOLITAN AREA CLINIC 20 | Facility: CLINIC | Age: 61
Setting detail: DERMATOLOGY
End: 2021-10-15

## 2021-10-15 DIAGNOSIS — D18.0 HEMANGIOMA: ICD-10-CM

## 2021-10-15 DIAGNOSIS — Z71.89 OTHER SPECIFIED COUNSELING: ICD-10-CM

## 2021-10-15 DIAGNOSIS — L82.1 OTHER SEBORRHEIC KERATOSIS: ICD-10-CM

## 2021-10-15 DIAGNOSIS — L81.4 OTHER MELANIN HYPERPIGMENTATION: ICD-10-CM

## 2021-10-15 DIAGNOSIS — D22 MELANOCYTIC NEVI: ICD-10-CM

## 2021-10-15 PROBLEM — D22.5 MELANOCYTIC NEVI OF TRUNK: Status: ACTIVE | Noted: 2021-10-15

## 2021-10-15 PROBLEM — D22.72 MELANOCYTIC NEVI OF LEFT LOWER LIMB, INCLUDING HIP: Status: ACTIVE | Noted: 2021-10-15

## 2021-10-15 PROBLEM — D22.71 MELANOCYTIC NEVI OF RIGHT LOWER LIMB, INCLUDING HIP: Status: ACTIVE | Noted: 2021-10-15

## 2021-10-15 PROBLEM — D22.62 MELANOCYTIC NEVI OF LEFT UPPER LIMB, INCLUDING SHOULDER: Status: ACTIVE | Noted: 2021-10-15

## 2021-10-15 PROBLEM — D22.61 MELANOCYTIC NEVI OF RIGHT UPPER LIMB, INCLUDING SHOULDER: Status: ACTIVE | Noted: 2021-10-15

## 2021-10-15 PROBLEM — D18.01 HEMANGIOMA OF SKIN AND SUBCUTANEOUS TISSUE: Status: ACTIVE | Noted: 2021-10-15

## 2021-10-15 PROBLEM — D22.39 MELANOCYTIC NEVI OF OTHER PARTS OF FACE: Status: ACTIVE | Noted: 2021-10-15

## 2021-10-15 PROCEDURE — ? COUNSELING

## 2021-10-15 PROCEDURE — 99213 OFFICE O/P EST LOW 20 MIN: CPT

## 2021-10-15 PROCEDURE — ? SUNSCREEN RECOMMENDATIONS

## 2021-10-15 ASSESSMENT — LOCATION ZONE DERM
LOCATION ZONE: TRUNK
LOCATION ZONE: LEG
LOCATION ZONE: ARM
LOCATION ZONE: FACE

## 2021-10-15 ASSESSMENT — LOCATION DETAILED DESCRIPTION DERM
LOCATION DETAILED: LEFT PROXIMAL POSTERIOR UPPER ARM
LOCATION DETAILED: LEFT DISTAL DORSAL FOREARM
LOCATION DETAILED: RIGHT ANTERIOR DISTAL THIGH
LOCATION DETAILED: RIGHT DISTAL POSTERIOR THIGH
LOCATION DETAILED: INFERIOR THORACIC SPINE
LOCATION DETAILED: LEFT LATERAL PROXIMAL PRETIBIAL REGION
LOCATION DETAILED: RIGHT SUPERIOR UPPER BACK
LOCATION DETAILED: RIGHT INFERIOR CENTRAL MALAR CHEEK
LOCATION DETAILED: RIGHT PROXIMAL PRETIBIAL REGION
LOCATION DETAILED: RIGHT DISTAL DORSAL FOREARM
LOCATION DETAILED: RIGHT INFERIOR FOREHEAD
LOCATION DETAILED: RIGHT VENTRAL PROXIMAL FOREARM
LOCATION DETAILED: RIGHT INFERIOR MEDIAL UPPER BACK
LOCATION DETAILED: LEFT DISTAL POSTERIOR THIGH
LOCATION DETAILED: RIGHT DISTAL POSTERIOR UPPER ARM
LOCATION DETAILED: RIGHT INFERIOR MEDIAL MIDBACK
LOCATION DETAILED: LEFT VENTRAL PROXIMAL FOREARM

## 2021-10-15 ASSESSMENT — LOCATION SIMPLE DESCRIPTION DERM
LOCATION SIMPLE: LEFT POSTERIOR THIGH
LOCATION SIMPLE: RIGHT POSTERIOR UPPER ARM
LOCATION SIMPLE: LEFT FOREARM
LOCATION SIMPLE: RIGHT LOWER BACK
LOCATION SIMPLE: LEFT POSTERIOR UPPER ARM
LOCATION SIMPLE: RIGHT FOREARM
LOCATION SIMPLE: LEFT PRETIBIAL REGION
LOCATION SIMPLE: RIGHT CHEEK
LOCATION SIMPLE: UPPER BACK
LOCATION SIMPLE: RIGHT UPPER BACK
LOCATION SIMPLE: RIGHT PRETIBIAL REGION
LOCATION SIMPLE: RIGHT THIGH
LOCATION SIMPLE: RIGHT FOREHEAD
LOCATION SIMPLE: RIGHT POSTERIOR THIGH

## 2022-01-13 PROBLEM — N63.20 MASS OF LEFT BREAST ON MAMMOGRAM: Status: ACTIVE | Noted: 2018-07-30

## 2022-01-13 PROBLEM — D75.1 POLYCYTHEMIA: Status: ACTIVE | Noted: 2021-04-19

## 2022-01-14 ENCOUNTER — OFFICE VISIT (OUTPATIENT)
Dept: INTERNAL MEDICINE | Facility: OTHER | Age: 62
End: 2022-01-14
Payer: COMMERCIAL

## 2022-01-14 VITALS
DIASTOLIC BLOOD PRESSURE: 82 MMHG | TEMPERATURE: 98.2 F | WEIGHT: 175.4 LBS | HEART RATE: 82 BPM | BODY MASS INDEX: 31.08 KG/M2 | HEIGHT: 63 IN | SYSTOLIC BLOOD PRESSURE: 127 MMHG | OXYGEN SATURATION: 97 %

## 2022-01-14 DIAGNOSIS — Z72.0 TOBACCO USE: ICD-10-CM

## 2022-01-14 DIAGNOSIS — E78.5 DYSLIPIDEMIA: ICD-10-CM

## 2022-01-14 DIAGNOSIS — D75.1 POLYCYTHEMIA: ICD-10-CM

## 2022-01-14 DIAGNOSIS — N63.20 MASS OF LEFT BREAST ON MAMMOGRAM: ICD-10-CM

## 2022-01-14 DIAGNOSIS — Z12.31 ENCOUNTER FOR SCREENING MAMMOGRAM FOR MALIGNANT NEOPLASM OF BREAST: ICD-10-CM

## 2022-01-14 DIAGNOSIS — Z91.89 OTHER SPECIFIED PERSONAL RISK FACTORS, NOT ELSEWHERE CLASSIFIED: ICD-10-CM

## 2022-01-14 DIAGNOSIS — I10 ESSENTIAL HYPERTENSION: ICD-10-CM

## 2022-01-14 PROCEDURE — 99213 OFFICE O/P EST LOW 20 MIN: CPT | Mod: GE | Performed by: STUDENT IN AN ORGANIZED HEALTH CARE EDUCATION/TRAINING PROGRAM

## 2022-01-14 RX ORDER — CHLORTHALIDONE 25 MG/1
TABLET ORAL
Qty: 90 TABLET | Refills: 2 | Status: SHIPPED | OUTPATIENT
Start: 2022-01-14 | End: 2022-09-09 | Stop reason: SDUPTHER

## 2022-01-14 ASSESSMENT — FIBROSIS 4 INDEX: FIB4 SCORE: 0.86

## 2022-01-14 ASSESSMENT — PATIENT HEALTH QUESTIONNAIRE - PHQ9: CLINICAL INTERPRETATION OF PHQ2 SCORE: 0

## 2022-01-14 NOTE — PROGRESS NOTES
Simi Massey is a 62 y.o. female here for Lab Follow-up (refill medication Chlorthalidone 25 mg ) and Overdue Lab And Imaging Result Follow-up (mammogram order)    HPI:     Patient is a 62 y.o F w/ pmhx of HTN, 60 packs year smoking and dyslipidemia here to re-establish care and for refill on Chlorthalidone. Patient reports she has been doing well, denies any recent illness or hospitalization. Reports that her mood is improved since last visits although stressors still exist with her daughter continuing on cancer treatment.   She is tolerating chlorthalidone well, does not take her bp at home although feels that it is improved since her stressors improved   Patient denies any recent changes in energy, BM or unwanted weight loss.     Current medicines (including changes today)  Current Outpatient Medications   Medication Sig Dispense Refill   • aspirin (ASA) 325 MG Tab ASPIRIN 325 MG TABS     • chlorthalidone (HYGROTON) 25 MG Tab CHLORTHALIDONE 25 MG TABS     • B Complex Vitamins (VITAMIN B COMPLEX) CAPS Take 1 Cap by mouth every day.         No current facility-administered medications for this visit.     She  has a past medical history of Anxiety, Back pain, Chickenpox, Depression, [place] measles, Head ache, Hypertension, Influenza, Pulmonary nodule, and Shingles. She also has no past medical history of Breast cancer (HCC).  She  has a past surgical history that includes appendectomy.  Social History     Tobacco Use   • Smoking status: Current Every Day Smoker     Packs/day: 1.00     Years: 44.00     Pack years: 44.00     Types: Cigarettes   • Smokeless tobacco: Never Used   • Tobacco comment: 13y @ 3ppd, quit x 2, then 1ppd (avd 1.5 ppd)   Vaping Use   • Vaping Use: Never used   Substance Use Topics   • Alcohol use: No     Comment: once or twice a year    • Drug use: No     Social History     Social History Narrative   • Not on file     Family History   Problem Relation Age of Onset   • Lung Disease Mother    •  "Cancer Maternal Grandmother         Lung   • Breast Cancer Daughter      Family Status   Relation Name Status   • Mo Emphysema    • Fa     • MGMo  (Not Specified)   • MGFa          MVA   • Sis  Alive   • Bro     • Ronit DCIS Alive         ROS    The pertinent  ROS findings can be seen in the HPI above.     All other systems reviewed and are negative     Objective:     /82 (BP Location: Left arm, Patient Position: Sitting, BP Cuff Size: Large adult)   Pulse 82   Temp 36.8 °C (98.2 °F) (Temporal)   Ht 1.6 m (5' 3\")   Wt 79.6 kg (175 lb 6.4 oz)   SpO2 97%  Body mass index is 31.07 kg/m².      Physical Exam:    Constitutional: Alert, no distress.  Skin: No suspicious lesions  Eye: Equal, round and reactive, conjunctiva clear, lids normal.  ENMT: Lips without lesions, good dentition, oropharynx clear.  Neck: Trachea midline, no masses, no thyromegaly. No cervical or supraclavicular lymphadenopathy.  Respiratory: Unlabored respiratory effort, lungs clear to auscultation, no wheezes, no ronchi.  Cardiovascular: Normal S1, S2, no murmur, no edema  Abdomen: Soft, non-tender, no masses, no hepatosplenomegaly.  Musculoskeletal: Adequately aligned spine. ROM intact spine and extremities. No joint erythema or tenderness. Normal muscular development. Normal gait  Neuro: CN II-XII intact. Reflexes 2+ throughout. Cerebellar testing normal. Normal and equal strength in all extremities  Psych: Mood stable, affect appropriate, thoughts and speech appropriate        Assessment and Plan:   Essential hypertension  -goal BP: <130/80.  Comorbidities: smoking, DLD    -BP today in office: 127/82. Not checking bp at home. Last CMP- WNL   -RFs: Age, smoiking  -Fhx: mother, father   -Meds: chlorthalidone  -BP at goal. Continue with current medication. Discuss ongoing risk with continued tobacco use, she expresses understanding. Monitor electrolytes while on diuretics  -Lifestyle modification counselling: " Weight loss/ Dietary changes/ Sodium restriction <1.5g/d/limit alcohol intake <1 drink/day   -Encourage continued weight loss, exercise      Mass of left breast on mammogram  - complex cyst found on screening mammogram. Repeat U/S stable after one year  - resume screening mammogram yearly    Dyslipidemia  LDL goal: 30-50% reduction  -ASCVD: 9.6%  -No history of CAD, ACS, stroke, TIA, PAD, DM, CKD 3-5  - Discussed benefits of primary prevention with statin in regards to CVD, kidney disease. Discussed increased risk due to smoking. Patient declines statin therapy at this time, discussed CT calcium scan to further evaluate and risk stratifies, she agrees. Bp management as above  -Advised weight loss, increase regular physical activity  -Cont Low chol diet        Polycythemia  - last H/H elevated, hemoconcentration vs secondary to smoking   - will repeat CBC to monitor and if persistent, will pursue further workup    Tobacco use  - annual lung screening with low dose CT, last done 9/21  - lung nodules stable   - patient understandings risks of ongoing smoking, not motivated to quit at this time. Has tried nicotine loranges in the past and had palpitations with them. Declines lower dose NRT or psychotherapy   - continue to engage in brieft intervention and encourage gradual reduction        Instructed to Follow up in clinic or ER for worsening symptoms, difficulty breathing, lack of expected recovery, or should new symptoms or problems arise.    Followup: Return in about 3 months (around 4/14/2022).

## 2022-01-15 NOTE — ASSESSMENT & PLAN NOTE
- last H/H elevated, hemoconcentration vs secondary to smoking   - will repeat CBC to monitor and if persistent, will pursue further workup

## 2022-01-15 NOTE — ASSESSMENT & PLAN NOTE
-goal BP: <130/80.  Comorbidities: smoking, DLD    -BP today in office: 127/82. Not checking bp at home. Last CMP- WNL   -RFs: Age, smoiking  -Fhx: mother, father   -Meds: chlorthalidone  -BP at goal. Continue with current medication. Discuss ongoing risk with continued tobacco use, she expresses understanding. Monitor electrolytes while on diuretics  -Lifestyle modification counselling: Weight loss/ Dietary changes/ Sodium restriction <1.5g/d/limit alcohol intake <1 drink/day   -Encourage continued weight loss, exercise

## 2022-01-15 NOTE — ASSESSMENT & PLAN NOTE
- complex cyst found on screening mammogram. Repeat U/S stable after one year  - resume screening mammogram yearly

## 2022-01-15 NOTE — ASSESSMENT & PLAN NOTE
- annual lung screening with low dose CT, last done 9/21  - lung nodules stable   - patient understandings risks of ongoing smoking, not motivated to quit at this time. Has tried nicotine loranges in the past and had palpitations with them. Declines lower dose NRT or psychotherapy   - continue to engage in brieft intervention and encourage gradual reduction

## 2022-01-15 NOTE — ASSESSMENT & PLAN NOTE
LDL goal: 30-50% reduction  -ASCVD: 9.6%  -No history of CAD, ACS, stroke, TIA, PAD, DM, CKD 3-5  - Discussed benefits of primary prevention with statin in regards to CVD, kidney disease. Discussed increased risk due to smoking. Patient declines statin therapy at this time, discussed CT calcium scan to further evaluate and risk stratifies, she agrees. Bp management as above  -Advised weight loss, increase regular physical activity  -Cont Low chol diet

## 2022-02-09 ENCOUNTER — HOSPITAL ENCOUNTER (OUTPATIENT)
Dept: RADIOLOGY | Facility: MEDICAL CENTER | Age: 62
End: 2022-02-09
Attending: STUDENT IN AN ORGANIZED HEALTH CARE EDUCATION/TRAINING PROGRAM
Payer: COMMERCIAL

## 2022-02-09 DIAGNOSIS — Z12.31 VISIT FOR SCREENING MAMMOGRAM: ICD-10-CM

## 2022-02-09 PROCEDURE — 77063 BREAST TOMOSYNTHESIS BI: CPT

## 2022-03-21 ENCOUNTER — HOSPITAL ENCOUNTER (OUTPATIENT)
Dept: LAB | Facility: MEDICAL CENTER | Age: 62
End: 2022-03-21
Attending: STUDENT IN AN ORGANIZED HEALTH CARE EDUCATION/TRAINING PROGRAM
Payer: COMMERCIAL

## 2022-03-21 DIAGNOSIS — D75.1 POLYCYTHEMIA: ICD-10-CM

## 2022-03-21 DIAGNOSIS — E78.5 DYSLIPIDEMIA: ICD-10-CM

## 2022-03-21 DIAGNOSIS — I10 ESSENTIAL HYPERTENSION: ICD-10-CM

## 2022-03-21 LAB
ALBUMIN SERPL BCP-MCNC: 4.7 G/DL (ref 3.2–4.9)
ALBUMIN/GLOB SERPL: 2 G/DL
ALP SERPL-CCNC: 100 U/L (ref 30–99)
ALT SERPL-CCNC: 14 U/L (ref 2–50)
ANION GAP SERPL CALC-SCNC: 10 MMOL/L (ref 7–16)
AST SERPL-CCNC: 16 U/L (ref 12–45)
BASOPHILS # BLD AUTO: 1.2 % (ref 0–1.8)
BASOPHILS # BLD: 0.11 K/UL (ref 0–0.12)
BILIRUB SERPL-MCNC: 0.2 MG/DL (ref 0.1–1.5)
BUN SERPL-MCNC: 17 MG/DL (ref 8–22)
CALCIUM SERPL-MCNC: 9.8 MG/DL (ref 8.5–10.5)
CHLORIDE SERPL-SCNC: 101 MMOL/L (ref 96–112)
CHOLEST SERPL-MCNC: 227 MG/DL (ref 100–199)
CO2 SERPL-SCNC: 29 MMOL/L (ref 20–33)
CREAT SERPL-MCNC: 0.82 MG/DL (ref 0.5–1.4)
EOSINOPHIL # BLD AUTO: 0.26 K/UL (ref 0–0.51)
EOSINOPHIL NFR BLD: 2.9 % (ref 0–6.9)
ERYTHROCYTE [DISTWIDTH] IN BLOOD BY AUTOMATED COUNT: 47 FL (ref 35.9–50)
GFR SERPLBLD CREATININE-BSD FMLA CKD-EPI: 82 ML/MIN/1.73 M 2
GLOBULIN SER CALC-MCNC: 2.4 G/DL (ref 1.9–3.5)
GLUCOSE SERPL-MCNC: 96 MG/DL (ref 65–99)
HCT VFR BLD AUTO: 48.1 % (ref 37–47)
HDLC SERPL-MCNC: 69 MG/DL
HGB BLD-MCNC: 15.9 G/DL (ref 12–16)
IMM GRANULOCYTES # BLD AUTO: 0.03 K/UL (ref 0–0.11)
IMM GRANULOCYTES NFR BLD AUTO: 0.3 % (ref 0–0.9)
LDLC SERPL CALC-MCNC: 144 MG/DL
LYMPHOCYTES # BLD AUTO: 1.24 K/UL (ref 1–4.8)
LYMPHOCYTES NFR BLD: 13.7 % (ref 22–41)
MCH RBC QN AUTO: 29.9 PG (ref 27–33)
MCHC RBC AUTO-ENTMCNC: 33.1 G/DL (ref 33.6–35)
MCV RBC AUTO: 90.4 FL (ref 81.4–97.8)
MONOCYTES # BLD AUTO: 0.65 K/UL (ref 0–0.85)
MONOCYTES NFR BLD AUTO: 7.2 % (ref 0–13.4)
NEUTROPHILS # BLD AUTO: 6.79 K/UL (ref 2–7.15)
NEUTROPHILS NFR BLD: 74.7 % (ref 44–72)
NRBC # BLD AUTO: 0 K/UL
NRBC BLD-RTO: 0 /100 WBC
PLATELET # BLD AUTO: 303 K/UL (ref 164–446)
PMV BLD AUTO: 9.9 FL (ref 9–12.9)
POTASSIUM SERPL-SCNC: 4.5 MMOL/L (ref 3.6–5.5)
PROT SERPL-MCNC: 7.1 G/DL (ref 6–8.2)
RBC # BLD AUTO: 5.32 M/UL (ref 4.2–5.4)
SODIUM SERPL-SCNC: 140 MMOL/L (ref 135–145)
TRIGL SERPL-MCNC: 71 MG/DL (ref 0–149)
WBC # BLD AUTO: 9.1 K/UL (ref 4.8–10.8)

## 2022-03-21 PROCEDURE — 36415 COLL VENOUS BLD VENIPUNCTURE: CPT

## 2022-03-21 PROCEDURE — 85025 COMPLETE CBC W/AUTO DIFF WBC: CPT

## 2022-03-21 PROCEDURE — 80053 COMPREHEN METABOLIC PANEL: CPT

## 2022-03-21 PROCEDURE — 80061 LIPID PANEL: CPT

## 2022-03-25 ENCOUNTER — HOSPITAL ENCOUNTER (OUTPATIENT)
Dept: RADIOLOGY | Facility: MEDICAL CENTER | Age: 62
End: 2022-03-25
Attending: STUDENT IN AN ORGANIZED HEALTH CARE EDUCATION/TRAINING PROGRAM
Payer: COMMERCIAL

## 2022-03-25 DIAGNOSIS — Z91.89 OTHER SPECIFIED PERSONAL RISK FACTORS, NOT ELSEWHERE CLASSIFIED: ICD-10-CM

## 2022-03-25 DIAGNOSIS — E78.5 DYSLIPIDEMIA: ICD-10-CM

## 2022-03-25 PROCEDURE — 4410556 CT-CARDIAC SCORING (SELF PAY ONLY)

## 2022-05-04 ENCOUNTER — OFFICE VISIT (OUTPATIENT)
Dept: INTERNAL MEDICINE | Facility: OTHER | Age: 62
End: 2022-05-04
Payer: COMMERCIAL

## 2022-05-04 VITALS
BODY MASS INDEX: 31.64 KG/M2 | OXYGEN SATURATION: 94 % | WEIGHT: 178.6 LBS | TEMPERATURE: 97.8 F | HEIGHT: 63 IN | DIASTOLIC BLOOD PRESSURE: 87 MMHG | SYSTOLIC BLOOD PRESSURE: 139 MMHG | HEART RATE: 103 BPM

## 2022-05-04 DIAGNOSIS — I10 ESSENTIAL HYPERTENSION: ICD-10-CM

## 2022-05-04 DIAGNOSIS — Z72.0 TOBACCO USE: ICD-10-CM

## 2022-05-04 DIAGNOSIS — E78.5 DYSLIPIDEMIA: ICD-10-CM

## 2022-05-04 PROBLEM — R92.8 OTHER ABNORMAL AND INCONCLUSIVE FINDINGS ON DIAGNOSTIC IMAGING OF BREAST: Status: ACTIVE | Noted: 2019-10-17

## 2022-05-04 PROCEDURE — 99213 OFFICE O/P EST LOW 20 MIN: CPT | Mod: GE | Performed by: STUDENT IN AN ORGANIZED HEALTH CARE EDUCATION/TRAINING PROGRAM

## 2022-05-04 RX ORDER — VARENICLINE TARTRATE 1 MG/1
TABLET, FILM COATED ORAL
Qty: 180 TABLET | Refills: 0 | Status: SHIPPED | OUTPATIENT
Start: 2022-05-04 | End: 2022-09-09

## 2022-05-04 RX ORDER — VARENICLINE TARTRATE 1 MG/1
TABLET, FILM COATED ORAL
Qty: 60 TABLET | Refills: 2 | Status: SHIPPED | OUTPATIENT
Start: 2022-05-04 | End: 2022-05-04

## 2022-05-04 ASSESSMENT — FIBROSIS 4 INDEX: FIB4 SCORE: 0.85

## 2022-05-04 NOTE — ASSESSMENT & PLAN NOTE
- ASCVD 9.6%, CCTA score 15.6, result discussed with patient. Risk reduction strategies discussed, she declines statin therapy at this time. Will focus on blood pressure control and smoking cessation, see A&P for tobacco use   - recommends physical activity of at least 150 minutes of moderate intensity or 75 minutes of high intensity activities each week.   - Recommends Mediterranean diet   - repeat lipids in one year to reassess risk

## 2022-05-04 NOTE — PATIENT INSTRUCTIONS
- pick a quit date, start Chantix one week prior to your quit date  - I have sent in a referral for smoking cessation therapy. Please call the office   - Follow up in 3-4 months    Stroke Prevention  Some medical conditions and lifestyle choices can lead to a higher risk for a stroke. You can help to prevent a stroke by making nutrition, lifestyle, and other changes.  What nutrition changes can be made?    · Eat healthy foods.  ? Choose foods that are high in fiber. These include:  § Fresh fruits.  § Fresh vegetables.  § Whole grains.  ? Eat at least 5 or more servings of fruits and vegetables each day. Try to fill half of your plate at each meal with fruits and vegetables.  ? Choose lean protein foods. These include:  § Lowfat (lean) cuts of meat.  § Chicken without skin.  § Fish.  § Tofu.  § Beans.  § Nuts.  ? Eat low-fat dairy products.  ? Avoid foods that:  § Are high in salt (sodium).  § Have saturated fat.  § Have trans fat.  § Have cholesterol.  § Are processed.  § Are premade.  · Follow eating guidelines as told by your doctor. These may include:  ? Reducing how many calories you eat and drink each day.  ? Limiting how much salt you eat or drink each day to 1,500 milligrams (mg).  ? Using only healthy fats for cooking. These include:  § Olive oil.  § Canola oil.  § Sunflower oil.  ? Counting how many carbohydrates you eat and drink each day.  What lifestyle changes can be made?  · Try to stay at a healthy weight. Talk to your doctor about what a good weight is for you.  · Get at least 30 minutes of moderate physical activity at least 5 days a week. This can include:  ? Fast walking.  ? Biking.  ? Swimming.  · Do not use any products that have nicotine or tobacco. This includes cigarettes and e-cigarettes. If you need help quitting, ask your doctor. Avoid being around tobacco smoke in general.  · Limit how much alcohol you drink to no more than 1 drink a day for nonpregnant women and 2 drinks a day for men.  "One drink equals 12 oz of beer, 5 oz of wine, or 1½ oz of hard liquor.  · Do not use drugs.  · Avoid taking birth control pills. Talk to your doctor about the risks of taking birth control pills if:  ? You are over 35 years old.  ? You smoke.  ? You get migraines.  ? You have had a blood clot.  What other changes can be made?  · Manage your cholesterol.  ? It is important to eat a healthy diet.  ? If your cholesterol cannot be managed through your diet, you may also need to take medicines. Take medicines as told by your doctor.  · Manage your diabetes.  ? It is important to eat a healthy diet and to exercise regularly.  ? If your blood sugar cannot be managed through diet and exercise, you may need to take medicines. Take medicines as told by your doctor.  · Control your high blood pressure (hypertension).  ? Try to keep your blood pressure below 130/80. This can help lower your risk of stroke.  ? It is important to eat a healthy diet and to exercise regularly.  ? If your blood pressure cannot be managed through diet and exercise, you may need to take medicines. Take medicines as told by your doctor.  ? Ask your doctor if you should check your blood pressure at home.  ? Have your blood pressure checked every year. Do this even if your blood pressure is normal.  · Talk to your doctor about getting checked for a sleep disorder. Signs of this can include:  ? Snoring a lot.  ? Feeling very tired.  · Take over-the-counter and prescription medicines only as told by your doctor. These may include aspirin or blood thinners (antiplatelets or anticoagulants).  · Make sure that any other medical conditions you have are managed.  Where to find more information  · American Stroke Association: www.strokeassociation.org  · National Stroke Association: www.stroke.org  Get help right away if:  · You have any symptoms of stroke. \"BE FAST\" is an easy way to remember the main warning signs:  ? B - Balance. Signs are dizziness, sudden " trouble walking, or loss of balance.  ? E - Eyes. Signs are trouble seeing or a sudden change in how you see.  ? F - Face. Signs are sudden weakness or loss of feeling of the face, or the face or eyelid drooping on one side.  ? A - Arms. Signs are weakness or loss of feeling in an arm. This happens suddenly and usually on one side of the body.  ? S - Speech. Signs are sudden trouble speaking, slurred speech, or trouble understanding what people say.  ? T - Time. Time to call emergency services. Write down what time symptoms started.  · You have other signs of stroke, such as:  ? A sudden, very bad headache with no known cause.  ? Feeling sick to your stomach (nausea).  ? Throwing up (vomiting).  ? Jerky movements you cannot control (seizure).  These symptoms may represent a serious problem that is an emergency. Do not wait to see if the symptoms will go away. Get medical help right away. Call your local emergency services (911 in the [place] Do not drive yourself to the hospital.  Summary  · You can prevent a stroke by eating healthy, exercising, not smoking, drinking less alcohol, and treating other health problems, such as diabetes, high blood pressure, or high cholesterol.  · Do not use any products that contain nicotine or tobacco, such as cigarettes and e-cigarettes.  · Get help right away if you have any signs or symptoms of a stroke.  This information is not intended to replace advice given to you by your health care provider. Make sure you discuss any questions you have with your health care provider.  Document Released: 06/18/2013 Document Revised: 02/13/2020 Document Reviewed: 03/21/2018  Elsevier Patient Education © 2020 [employer]

## 2022-05-04 NOTE — ASSESSMENT & PLAN NOTE
- currently 1ppd/day, total 60 paks year history- motivated to quit to reduce her CV risk   - trial of Chantix, discussed starting first dose one week prior to quit date, encourage to start tomorrow   - referral to CBT for smoking cessation placed   - hx of multiple sub 6mm nodules on right lung, low dose CT for monitoring yearly  - follow up in 3 weeks

## 2022-05-04 NOTE — ASSESSMENT & PLAN NOTE
-goal BP: <130/80.  Elevated today in office, previously at goal. Reports elevation due to stress of making appointment on time. Will continue to monitor  -RF: age, smoking, family hx  -Meds: chlorthalidone 25mg.  Continue with current medication.   Discuss ongoing risk with continued tobacco use, she expresses understanding. Monitor electrolytes while on diuretics  -Lifestyle modification counselling: Weight loss/ Dietary changes/ Sodium restriction <1.5g/d/limit alcohol intake <1 drink/day   -See A&P for DLD and tobacco cessation

## 2022-05-04 NOTE — PROGRESS NOTES
Simi Massey is a 62 y.o. female here for Follow-Up    HPI:     Patient is a 62 y.o F w/ pmhx of HTN, 60 packs year smoking and dyslipidemia here to discuss Coronary Calcium Score report and smoking cessation. She has no acute complaint today, denies any recent illnesses, hospitalization or ED visit.   Patient denies any recent changes in energy, BM or unwanted weight loss.     ASCVD risk 9.6%   CT calcium Score- 15.7, above 50%ile for age group   Result discussed with patient, questions answered    Discussed risk reduction strategies- including starting statin, more aggressive bp control and smoking cessation. She continues to decline starting statin for primary prevention. Reports that her elevated bp today is due exertion, trying to rush to office this AM. She is motivated to quit smoking. Has picked up nicotine gum since last visit (patch ordered but not covered by insurance) although does not feel that is helpful. She reports smoking trigger includes: concern over work performance, miscommunication with work supervisor and colleague. She is aware of her triggers but has been unsuccessful in controlling her reaction. Discussed psychotherapy for mental health, career coaching and smoking-cessation specific CBT. She is interested in CBT at this time. She is also willing to try Chantix.    Current medicines (including changes today)  Current Outpatient Medications   Medication Sig Dispense Refill   • varenicline (CHANTIX) 1 MG tablet Take one-half tablet (0.5mg) once a day on day 1-3, take one-half tablet twice daily on day 4-7, take one tablet twice daily thereafter 60 Tablet 2   • chlorthalidone (HYGROTON) 25 MG Tab Take one tab daily for blood pressure 90 Tablet 2   • aspirin (ASA) 325 MG Tab ASPIRIN 325 MG TABS     • B Complex Vitamins (VITAMIN B COMPLEX) CAPS Take 1 Capsule by mouth every day.         No current facility-administered medications for this visit.     She  has a past medical history of  "Anxiety, Back pain, Chickenpox, Depression, Citizen of [place] measles, Head ache, Hypertension, Influenza, Pulmonary nodule, and Shingles.    She has no past medical history of Breast cancer (HCC).  She  has a past surgical history that includes appendectomy.  Social History     Tobacco Use   • Smoking status: Current Every Day Smoker     Packs/day: 1.00     Years: 44.00     Pack years: 44.00     Types: Cigarettes   • Smokeless tobacco: Never Used   • Tobacco comment: 15y @ 3ppd, quit x 2, then 1ppd (avd 1.5 ppd)   Vaping Use   • Vaping Use: Never used   Substance Use Topics   • Alcohol use: No     Comment: once or twice a year    • Drug use: No     Social History     Social History Narrative   • Not on file     Family History   Problem Relation Age of Onset   • Lung Disease Mother    • Cancer Maternal Grandmother         Lung   • Breast Cancer Daughter      Family Status   Relation Name Status   • Mo Emphysema    • Fa     • MGMo  (Not Specified)   • MGFa          MVA   • Sis  Alive   • Bro     • Ronit DCIS Alive         ROS    The pertinent  ROS findings can be seen in the HPI above.     All other systems reviewed and are negative     Objective:     /87 (BP Location: Right arm, Patient Position: Sitting, BP Cuff Size: Adult)   Pulse (!) 103   Temp 36.6 °C (97.8 °F) (Temporal)   Ht 1.6 m (5' 3\")   Wt 81 kg (178 lb 9.6 oz)   SpO2 94%  Body mass index is 31.64 kg/m².      Physical Exam:    Constitutional: Alert, no distress.  Skin: No suspicious lesions  Eye: Equal, round and reactive, conjunctiva clear, lids normal.  ENMT: Lips without lesions, good dentition, oropharynx clear.  Neck: Trachea midline, no masses, no thyromegaly. No cervical or supraclavicular lymphadenopathy.  Respiratory: Unlabored respiratory effort, lungs clear to auscultation, no wheezes, no ronchi.  Cardiovascular: Normal S1, S2, no murmur, no edema  Abdomen: Soft, non-tender, no masses, no " hepatosplenomegaly.  Musculoskeletal: Adequately aligned spine. ROM intact spine and extremities. No joint erythema or tenderness. Normal muscular development. Normal gait  Neuro: CN II-XII intact. Reflexes 2+ throughout. Cerebellar testing normal. Normal and equal strength in all extremities  Psych: Mood stable, affect appropriate, thoughts and speech appropriate        Assessment and Plan:   Dyslipidemia  - ASCVD 9.6%, CCTA score 15.6, result discussed with patient. Risk reduction strategies discussed, she declines statin therapy at this time. Will focus on blood pressure control and smoking cessation, see A&P for tobacco use   - recommends physical activity of at least 150 minutes of moderate intensity or 75 minutes of high intensity activities each week.   - Recommends Mediterranean diet   - repeat lipids in one year to reassess risk     Tobacco use  - currently 1ppd/day, total 60 paks year history- motivated to quit to reduce her CV risk   - trial of Chantix, discussed starting first dose one week prior to quit date, encourage to start tomorrow   - referral to CBT for smoking cessation placed   - hx of multiple sub 6mm nodules on right lung, low dose CT for monitoring yearly  - follow up in 3 weeks    Essential hypertension  -goal BP: <130/80.  Elevated today in office, previously at goal. Reports elevation due to stress of making appointment on time. Will continue to monitor  -RF: age, smoking, family hx  -Meds: chlorthalidone 25mg.  Continue with current medication.   Discuss ongoing risk with continued tobacco use, she expresses understanding. Monitor electrolytes while on diuretics  -Lifestyle modification counselling: Weight loss/ Dietary changes/ Sodium restriction <1.5g/d/limit alcohol intake <1 drink/day   -See A&P for DLD and tobacco cessation        Instructed to Follow up in clinic or ER for worsening symptoms, difficulty breathing, lack of expected recovery, or should new symptoms or problems  arise.    Followup: Return in about 3 months (around 8/4/2022).

## 2022-09-07 NOTE — PROGRESS NOTES
Pulmonary Clinic Note    Date of Visit: 9/12/2022     Chief Complaint:  Chief Complaint   Patient presents with    Pulmonary Nodule     Last Seen 09/10/21    Results     Chest CT 09/09/22     HPI:   Simi Massey is a very pleasant 62 y.o. year old female current smoker (44+ pack-years), with a PMHx of pulmonary nodules, HTN, mood disorder, polycythemia, DSL, tobacco use who presented to the Pulmonary Clinic for a regular follow up. Last seen in the office on 9/10/2022 with BILL Lozano.     Patient is followed by the pulmonary office for pulmonary nodules.  CT chest dating back to 2019 shows multiple pulmonary nodules measuring 6.5 mm or less.  CT chest in 2021 shows stable bilateral pulmonary nodules measuring up to 6 mm without any new nodules or masses.  CT chest in 2022 shows stable pulmonary nodules measuring up to 6 mm without any new nodules, changes in size or morphology.  In May 2022 she quit smoking with the use of Chantix.  She states she feels better not smoking.  She denies any significant shortness of breath, cough, sputum production, wheezing, weight loss, night sweats.  Patient has received 2 COVID vaccinations and is up-to-date with pneumococcal.  She is due for the yearly influenza vaccine.    Current medication regimen:  None    MMRC ndGndrndanddndend:nd nd2nd Past Medical History:   Diagnosis Date    Anxiety     Back pain     Chickenpox     Depression     Citizen of the [place] measles     Head ache     Hypertension     Influenza     Pulmonary nodule     Shingles      Past Surgical History:   Procedure Laterality Date    APPENDECTOMY      1965     Social History     Socioeconomic History    Marital status:      Spouse name: Not on file    Number of children: Not on file    Years of education: Not on file    Highest education level: Not on file   Occupational History    Not on file   Tobacco Use    Smoking status: Former     Packs/day: 1.00     Years: 44.00     Pack years: 44.00     Types: Cigarettes     Quit  "date: 2022     Years since quittin.2    Smokeless tobacco: Never    Tobacco comments:     15y @ 3ppd, quit x 2, then 1ppd (avd 1.5 ppd)   Vaping Use    Vaping Use: Never used   Substance and Sexual Activity    Alcohol use: No     Comment: once or twice a year     Drug use: No    Sexual activity: Yes     Partners: Male   Other Topics Concern    Not on file   Social History Narrative    Not on file     Social Determinants of Health     Financial Resource Strain: Not on file   Food Insecurity: Not on file   Transportation Needs: Not on file   Physical Activity: Not on file   Stress: Not on file   Social Connections: Not on file   Intimate Partner Violence: Not on file   Housing Stability: Not on file        Family History   Problem Relation Age of Onset    Lung Disease Mother     Cancer Maternal Grandmother         Lung    Breast Cancer Daughter      Current Outpatient Medications on File Prior to Visit   Medication Sig Dispense Refill    chlorthalidone (HYGROTON) 25 MG Tab Take one tab daily for blood pressure 90 Tablet 2    aspirin (ASA) 325 MG Tab ASPIRIN 325 MG TABS       No current facility-administered medications on file prior to visit.     Allergies: Nkda [no known drug allergy]    ROS:   Review of Systems   Constitutional:  Negative for chills, diaphoresis, fever and malaise/fatigue.   HENT:  Negative for congestion and sinus pain.    Respiratory:  Negative for cough, hemoptysis, sputum production, shortness of breath and wheezing.    Cardiovascular:  Negative for chest pain, palpitations and leg swelling.   Gastrointestinal:  Negative for diarrhea, heartburn, nausea and vomiting.   Musculoskeletal:  Negative for falls and myalgias.   Neurological:  Negative for dizziness, weakness and headaches.     Vitals:  /80 (BP Location: Left arm, Patient Position: Sitting, BP Cuff Size: Adult)   Pulse 94   Resp 16   Ht 1.575 m (5' 2\")   Wt 84.4 kg (186 lb)   SpO2 97%     Physical Exam:  Physical " Exam  Constitutional:       General: She is not in acute distress.     Appearance: Normal appearance. She is not ill-appearing, toxic-appearing or diaphoretic.   Cardiovascular:      Rate and Rhythm: Normal rate and regular rhythm.      Heart sounds: No murmur heard.    No friction rub. No gallop.   Pulmonary:      Effort: No respiratory distress.      Breath sounds: Normal breath sounds. No stridor. No wheezing, rhonchi or rales.   Musculoskeletal:         General: No swelling.      Right lower leg: No edema.      Left lower leg: No edema.   Skin:     General: Skin is warm.   Neurological:      General: No focal deficit present.      Mental Status: She is alert and oriented to person, place, and time.   Psychiatric:         Mood and Affect: Mood normal.         Behavior: Behavior normal.         Thought Content: Thought content normal.         Judgment: Judgment normal.       Laboratory Data:  PFTs-  None on file  Impression:      CT Chest: (Date: 9/9/2022)-  Impression:  1.  Stable noncalcified pulmonary nodules measuring up to 6 mm.  2.  No definite new nodule.  3.  Emphysematous changes.  4.  Atherosclerosis.      Assessment and Plan:    Problem List Items Addressed This Visit       Pulmonary nodules     CT chest dating back to 2019 shows multiple pulmonary nodules measuring 6.5 mm or less.  CT chest in 2021 shows stable bilateral pulmonary nodules measuring up to 6 mm without any new nodules or masses.  CT chest in 2022 shows stable pulmonary nodules measuring up to 6 mm without any new nodules, changes in size or morphology.  Symptomatically, patient denies any significant shortness of breath, cough, sputum production, wheezing, weight loss, night sweats.   -- Continue with yearly low-dose CT chest through the lung cancer screening program.         Relevant Orders    CT-LUNG CANCER-SCREENING    Recently quit using tobacco     Patient recently quit smoking on 5/28/2022 with the use of Chantix.  She states that  Chantix is really helped her and does not have any cravings.  -- Encouraged and congratulated the patient to continue smoking cessation            Diagnostic studies have been reviewed with the patient.    Return in about 1 year (around 9/12/2023), or if symptoms worsen or fail to improve, for with CT before.     This note was generated using voice recognition software which has a chance of producing errors of grammar and possibly content.  I have made every reasonable attempt to find and correct any obvious errors, but it should be expected that some may not be found prior to finalization of this note.    Time spent in record review prior to patient arrival, reviewing results, and in face-to-face encounter totaled 30 min.  __________  MERY Luna  Pulmonary Medicine  [facility]

## 2022-09-09 ENCOUNTER — OFFICE VISIT (OUTPATIENT)
Dept: INTERNAL MEDICINE | Facility: OTHER | Age: 62
End: 2022-09-09
Payer: COMMERCIAL

## 2022-09-09 ENCOUNTER — HOSPITAL ENCOUNTER (OUTPATIENT)
Dept: RADIOLOGY | Facility: MEDICAL CENTER | Age: 62
End: 2022-09-09
Attending: PHYSICIAN ASSISTANT
Payer: COMMERCIAL

## 2022-09-09 VITALS
BODY MASS INDEX: 34.56 KG/M2 | HEART RATE: 67 BPM | DIASTOLIC BLOOD PRESSURE: 76 MMHG | TEMPERATURE: 97.8 F | OXYGEN SATURATION: 97 % | HEIGHT: 62 IN | WEIGHT: 187.8 LBS | SYSTOLIC BLOOD PRESSURE: 123 MMHG

## 2022-09-09 DIAGNOSIS — F17.200 ENCOUNTER FOR SCREENING FOR MALIGNANT NEOPLASM OF LUNG IN CURRENT SMOKER WITH 30 PACK YEAR HISTORY OR GREATER: ICD-10-CM

## 2022-09-09 DIAGNOSIS — E78.5 DYSLIPIDEMIA: ICD-10-CM

## 2022-09-09 DIAGNOSIS — Z23 NEED FOR PROPHYLACTIC VACCINATION WITH STREPTOCOCCUS PNEUMONIAE (PNEUMOCOCCUS) AND INFLUENZA VACCINES: ICD-10-CM

## 2022-09-09 DIAGNOSIS — Z72.0 TOBACCO USE: ICD-10-CM

## 2022-09-09 DIAGNOSIS — I10 ESSENTIAL HYPERTENSION: ICD-10-CM

## 2022-09-09 DIAGNOSIS — Z12.2 ENCOUNTER FOR SCREENING FOR MALIGNANT NEOPLASM OF LUNG IN CURRENT SMOKER WITH 30 PACK YEAR HISTORY OR GREATER: ICD-10-CM

## 2022-09-09 DIAGNOSIS — D75.1 POLYCYTHEMIA: ICD-10-CM

## 2022-09-09 PROCEDURE — 90471 IMMUNIZATION ADMIN: CPT | Performed by: STUDENT IN AN ORGANIZED HEALTH CARE EDUCATION/TRAINING PROGRAM

## 2022-09-09 PROCEDURE — 90677 PCV20 VACCINE IM: CPT | Performed by: STUDENT IN AN ORGANIZED HEALTH CARE EDUCATION/TRAINING PROGRAM

## 2022-09-09 PROCEDURE — 99214 OFFICE O/P EST MOD 30 MIN: CPT | Mod: 25,GC | Performed by: STUDENT IN AN ORGANIZED HEALTH CARE EDUCATION/TRAINING PROGRAM

## 2022-09-09 PROCEDURE — 71271 CT THORAX LUNG CANCER SCR C-: CPT

## 2022-09-09 RX ORDER — CHLORTHALIDONE 25 MG/1
TABLET ORAL
Qty: 90 TABLET | Refills: 2 | Status: SHIPPED | OUTPATIENT
Start: 2022-09-09 | End: 2023-01-13 | Stop reason: SDUPTHER

## 2022-09-09 ASSESSMENT — FIBROSIS 4 INDEX: FIB4 SCORE: 0.85

## 2022-09-09 NOTE — ASSESSMENT & PLAN NOTE
- in remission  - encourage ongoing abstinence, patient understands availability of buspar for maintenance, declines at this time, can restart Chantix if more craving/relapse   - encourage continue physical activity, self soothing strategies, mindfulness  - PCV 20 today (PPSV 23 in 2019), followed by Lung Nodule clinic, last LDCT earlier today with multiple noncalcified lung nodules that are stable  - Follow up in 3 months

## 2022-09-09 NOTE — ASSESSMENT & PLAN NOTE
-goal BP: <130/80.  At goal  -RF: age, smoking, family hx  -Meds: chlorthalidone 25mg.  Continue with current medication.   -Lifestyle modification counselling: Weight loss/ Dietary changes/ Sodium restriction <1.5g/d/limit alcohol intake <1 drink/day   - Encourage ongoing tobacco abstinence. Continue home bp monitoring   - Follow up in 6 months, monitor electrolytes with bmp

## 2022-09-09 NOTE — PROGRESS NOTES
Simi Massey is a 62 y.o. female here for Follow-Up, Hypertension, Dyslipidemia, and Medication Refill (Chlorthalidone Refill- states needs 1 year supply )    HPI:     Patient is a 62 y.o F w/ pmhx of HTN, 60 packs year smoking and dyslipidemia here for follow up of smoking cessation and HTN     Patient reports her last cigarette on 2022 (yay!), completed 12 weeks of Chantix therapy, still has cravings although managed with mindfulness practices. Also participating in [employer] (fitness program) which is helping her general wellbeing along with physical activity   Aware that she can restart Chantix if relapse   Declines buspar for maintenance     HTN- checks home bp occasionally, -130s, denies any HA, dizziness or chest pain   Tolerating chlorthalidone well     Current medicines (including changes today)  Current Outpatient Medications   Medication Sig Dispense Refill    chlorthalidone (HYGROTON) 25 MG Tab Take one tab daily for blood pressure 90 Tablet 2    aspirin (ASA) 325 MG Tab ASPIRIN 325 MG TABS      varenicline (CHANTIX) 1 MG tablet TAKE 1/2 TABLET BY MOUTH DAILY ON DAYS 1 TO 3, THEN 1/2 TABLET TWICE DAILY ON DAYS 4 TO 7, THEN 1 TABLET TWICE DAILY THEREAFTER (Patient not taking: Reported on 2022) 180 Tablet 0    B Complex Vitamins (VITAMIN B COMPLEX) CAPS Take 1 Capsule by mouth every day.   (Patient not taking: Reported on 2022)       No current facility-administered medications for this visit.     She  has a past medical history of Anxiety, Back pain, Chickenpox, Depression, [place] measles, Head ache, Hypertension, Influenza, Pulmonary nodule, and Shingles.    She has no past medical history of Breast cancer (HCC).  She  has a past surgical history that includes appendectomy.    Social History     Tobacco Use    Smoking status: Former     Packs/day: 1.00     Years: 44.00     Pack years: 44.00     Types: Cigarettes     Quit date: 2022     Years since quittin.2     "Smokeless tobacco: Never    Tobacco comments:     15y @ 3ppd, quit x 2, then 1ppd (avd 1.5 ppd)   Vaping Use    Vaping Use: Never used   Substance Use Topics    Alcohol use: No     Comment: once or twice a year     Drug use: No     Social History     Social History Narrative    Not on file     Family History   Problem Relation Age of Onset    Lung Disease Mother     Cancer Maternal Grandmother         Lung    Breast Cancer Daughter      Family Status   Relation Name Status    Mo Emphysema     Fa      MGMo  (Not Specified)    MGFa          MVA    Sis  Alive    Bro      Ronit DCIS Alive         ROS    The pertinent  ROS findings can be seen in the HPI above.     All other systems reviewed and are negative     Objective:     /76 (BP Location: Left arm, Patient Position: Sitting, BP Cuff Size: Adult)   Pulse 67   Temp 36.6 °C (97.8 °F) (Temporal)   Ht 1.575 m (5' 2\")   Wt 85.2 kg (187 lb 12.8 oz)   SpO2 97%  Body mass index is 34.35 kg/m².      Physical Exam:    Constitutional: Alert, no distress.  Skin: No suspicious lesions  Eye: Equal, round and reactive, conjunctiva clear, lids normal.  ENMT: Lips without lesions, good dentition, oropharynx clear.  Neck: Trachea midline, no masses, no thyromegaly. No cervical or supraclavicular lymphadenopathy.  Respiratory: Unlabored respiratory effort, lungs clear to auscultation, no wheezes, no ronchi.  Cardiovascular: Normal S1, S2, no murmur, no edema  Abdomen: Soft, non-tender, no masses, no hepatosplenomegaly.  Musculoskeletal: Adequately aligned spine. ROM intact spine and extremities. No joint erythema or tenderness. Normal muscular development. Normal gait  Neuro: CN II-XII intact. Reflexes 2+ throughout. Cerebellar testing normal. Normal and equal strength in all extremities  Psych: Mood stable, affect appropriate, thoughts and speech appropriate        Assessment and Plan:   Essential hypertension  -goal BP: <130/80.  At " goal  -RF: age, smoking, family hx  -Meds: chlorthalidone 25mg.  Continue with current medication.   -Lifestyle modification counselling: Weight loss/ Dietary changes/ Sodium restriction <1.5g/d/limit alcohol intake <1 drink/day   - Encourage ongoing tobacco abstinence. Continue home bp monitoring   - Follow up in 6 months, monitor electrolytes with bmp      Tobacco use  - in remission  - encourage ongoing abstinence, patient understands availability of buspar for maintenance, declines at this time, can restart Chantix if more craving/relapse   - encourage continue physical activity, self soothing strategies, mindfulness  - PCV 20 today (PPSV 23 in 2019), followed by Lung Nodule clinic, last LDCT earlier today with multiple noncalcified lung nodules that are stable  - Follow up in 3 months       Instructed to Follow up in clinic or ER for worsening symptoms, difficulty breathing, lack of expected recovery, or should new symptoms or problems arise.    Followup: Return in about 3 months (around 12/9/2022).

## 2022-09-09 NOTE — PATIENT INSTRUCTIONS
- congratulations on your tobacco quitting! Keep up the good work!  - restart Chantix if craving increased or call if you'd like to discuss alternatives for maintenance   - follow up in 3 months, please get lab work done prior to next appointment

## 2022-09-12 ENCOUNTER — OFFICE VISIT (OUTPATIENT)
Dept: SLEEP MEDICINE | Facility: MEDICAL CENTER | Age: 62
End: 2022-09-12
Payer: COMMERCIAL

## 2022-09-12 VITALS
HEART RATE: 94 BPM | DIASTOLIC BLOOD PRESSURE: 80 MMHG | SYSTOLIC BLOOD PRESSURE: 120 MMHG | RESPIRATION RATE: 16 BRPM | BODY MASS INDEX: 34.23 KG/M2 | WEIGHT: 186 LBS | OXYGEN SATURATION: 97 % | HEIGHT: 62 IN

## 2022-09-12 DIAGNOSIS — R91.8 PULMONARY NODULES: ICD-10-CM

## 2022-09-12 DIAGNOSIS — Z87.891 RECENTLY QUIT USING TOBACCO: ICD-10-CM

## 2022-09-12 PROCEDURE — 99214 OFFICE O/P EST MOD 30 MIN: CPT

## 2022-09-12 ASSESSMENT — ENCOUNTER SYMPTOMS
FALLS: 0
VOMITING: 0
DIARRHEA: 0
HEARTBURN: 0
MYALGIAS: 0
HEMOPTYSIS: 0
CHILLS: 0
DIZZINESS: 0
SPUTUM PRODUCTION: 0
WHEEZING: 0
NAUSEA: 0
DIAPHORESIS: 0
HEADACHES: 0
WEAKNESS: 0
COUGH: 0
SINUS PAIN: 0
SHORTNESS OF BREATH: 0
FEVER: 0
PALPITATIONS: 0

## 2022-09-12 ASSESSMENT — FIBROSIS 4 INDEX: FIB4 SCORE: 0.85

## 2022-09-12 NOTE — ASSESSMENT & PLAN NOTE
Patient recently quit smoking on 5/28/2022 with the use of Chantix.  She states that Chantix is really helped her and does not have any cravings.  -- Encouraged and congratulated the patient to continue smoking cessation

## 2022-09-12 NOTE — ASSESSMENT & PLAN NOTE
CT chest dating back to 2019 shows multiple pulmonary nodules measuring 6.5 mm or less.  CT chest in 2021 shows stable bilateral pulmonary nodules measuring up to 6 mm without any new nodules or masses.  CT chest in 2022 shows stable pulmonary nodules measuring up to 6 mm without any new nodules, changes in size or morphology.  Symptomatically, patient denies any significant shortness of breath, cough, sputum production, wheezing, weight loss, night sweats.   -- Continue with yearly low-dose CT chest through the lung cancer screening program.

## 2022-10-21 ENCOUNTER — APPOINTMENT (RX ONLY)
Dept: URBAN - METROPOLITAN AREA CLINIC 20 | Facility: CLINIC | Age: 62
Setting detail: DERMATOLOGY
End: 2022-10-21

## 2022-10-21 DIAGNOSIS — L81.4 OTHER MELANIN HYPERPIGMENTATION: ICD-10-CM

## 2022-10-21 DIAGNOSIS — L85.3 XEROSIS CUTIS: ICD-10-CM

## 2022-10-21 DIAGNOSIS — D18.0 HEMANGIOMA: ICD-10-CM

## 2022-10-21 DIAGNOSIS — D22 MELANOCYTIC NEVI: ICD-10-CM

## 2022-10-21 DIAGNOSIS — Z71.89 OTHER SPECIFIED COUNSELING: ICD-10-CM

## 2022-10-21 DIAGNOSIS — L82.1 OTHER SEBORRHEIC KERATOSIS: ICD-10-CM

## 2022-10-21 PROBLEM — D22.72 MELANOCYTIC NEVI OF LEFT LOWER LIMB, INCLUDING HIP: Status: ACTIVE | Noted: 2022-10-21

## 2022-10-21 PROBLEM — D18.01 HEMANGIOMA OF SKIN AND SUBCUTANEOUS TISSUE: Status: ACTIVE | Noted: 2022-10-21

## 2022-10-21 PROBLEM — D22.39 MELANOCYTIC NEVI OF OTHER PARTS OF FACE: Status: ACTIVE | Noted: 2022-10-21

## 2022-10-21 PROBLEM — D22.5 MELANOCYTIC NEVI OF TRUNK: Status: ACTIVE | Noted: 2022-10-21

## 2022-10-21 PROBLEM — D22.61 MELANOCYTIC NEVI OF RIGHT UPPER LIMB, INCLUDING SHOULDER: Status: ACTIVE | Noted: 2022-10-21

## 2022-10-21 PROBLEM — D22.71 MELANOCYTIC NEVI OF RIGHT LOWER LIMB, INCLUDING HIP: Status: ACTIVE | Noted: 2022-10-21

## 2022-10-21 PROBLEM — D22.62 MELANOCYTIC NEVI OF LEFT UPPER LIMB, INCLUDING SHOULDER: Status: ACTIVE | Noted: 2022-10-21

## 2022-10-21 PROCEDURE — ? SUNSCREEN RECOMMENDATIONS

## 2022-10-21 PROCEDURE — 99213 OFFICE O/P EST LOW 20 MIN: CPT

## 2022-10-21 PROCEDURE — ? COUNSELING

## 2022-10-21 ASSESSMENT — LOCATION ZONE DERM
LOCATION ZONE: FACE
LOCATION ZONE: TRUNK
LOCATION ZONE: ARM
LOCATION ZONE: LEG

## 2022-10-21 ASSESSMENT — LOCATION DETAILED DESCRIPTION DERM
LOCATION DETAILED: RIGHT VENTRAL PROXIMAL FOREARM
LOCATION DETAILED: RIGHT PROXIMAL PRETIBIAL REGION
LOCATION DETAILED: RIGHT DISTAL POSTERIOR UPPER ARM
LOCATION DETAILED: LEFT PROXIMAL POSTERIOR UPPER ARM
LOCATION DETAILED: LEFT VENTRAL PROXIMAL FOREARM
LOCATION DETAILED: RIGHT INFERIOR MEDIAL UPPER BACK
LOCATION DETAILED: INFERIOR THORACIC SPINE
LOCATION DETAILED: RIGHT SUPERIOR UPPER BACK
LOCATION DETAILED: LEFT DISTAL PRETIBIAL REGION
LOCATION DETAILED: RIGHT INFERIOR CENTRAL MALAR CHEEK
LOCATION DETAILED: RIGHT DISTAL PRETIBIAL REGION
LOCATION DETAILED: LEFT DISTAL POSTERIOR THIGH
LOCATION DETAILED: RIGHT INFERIOR FOREHEAD
LOCATION DETAILED: RIGHT INFERIOR MEDIAL MIDBACK
LOCATION DETAILED: LEFT LATERAL PROXIMAL PRETIBIAL REGION
LOCATION DETAILED: RIGHT DISTAL POSTERIOR THIGH

## 2022-10-21 ASSESSMENT — LOCATION SIMPLE DESCRIPTION DERM
LOCATION SIMPLE: RIGHT POSTERIOR UPPER ARM
LOCATION SIMPLE: RIGHT FOREARM
LOCATION SIMPLE: RIGHT CHEEK
LOCATION SIMPLE: RIGHT PRETIBIAL REGION
LOCATION SIMPLE: RIGHT POSTERIOR THIGH
LOCATION SIMPLE: RIGHT UPPER BACK
LOCATION SIMPLE: RIGHT FOREHEAD
LOCATION SIMPLE: LEFT POSTERIOR UPPER ARM
LOCATION SIMPLE: LEFT FOREARM
LOCATION SIMPLE: UPPER BACK
LOCATION SIMPLE: LEFT PRETIBIAL REGION
LOCATION SIMPLE: LEFT POSTERIOR THIGH
LOCATION SIMPLE: RIGHT LOWER BACK

## 2023-01-11 ENCOUNTER — OFFICE VISIT (OUTPATIENT)
Dept: URGENT CARE | Facility: PHYSICIAN GROUP | Age: 63
End: 2023-01-11
Payer: COMMERCIAL

## 2023-01-11 VITALS
BODY MASS INDEX: 35.01 KG/M2 | DIASTOLIC BLOOD PRESSURE: 84 MMHG | RESPIRATION RATE: 17 BRPM | SYSTOLIC BLOOD PRESSURE: 146 MMHG | HEART RATE: 88 BPM | TEMPERATURE: 98.1 F | OXYGEN SATURATION: 98 % | WEIGHT: 191.4 LBS

## 2023-01-11 DIAGNOSIS — M25.531 WRIST PAIN, ACUTE, RIGHT: ICD-10-CM

## 2023-01-11 DIAGNOSIS — S66.911A WRIST STRAIN, RIGHT, INITIAL ENCOUNTER: ICD-10-CM

## 2023-01-11 PROCEDURE — 99213 OFFICE O/P EST LOW 20 MIN: CPT | Performed by: NURSE PRACTITIONER

## 2023-01-11 ASSESSMENT — ENCOUNTER SYMPTOMS
CHILLS: 0
TINGLING: 0
WEAKNESS: 0
FALLS: 0
FEVER: 0
BRUISES/BLEEDS EASILY: 0
SENSORY CHANGE: 0
MYALGIAS: 1

## 2023-01-11 ASSESSMENT — FIBROSIS 4 INDEX: FIB4 SCORE: 0.81

## 2023-01-11 NOTE — PROGRESS NOTES
"Subjective     Simi Massey is a 63 y.o. female who presents with Other (Severe right wrist pain since yesterday afternoon. Pt states that she could not sleep last night. She is having a hard time with making a fist. )            Other  Associated symptoms include myalgias. Pertinent negatives include no chills, fever, rash or weakness.   States experiencing acute right wrist and hand pain since last night.   has been using ibuprofen and Tylenol as well as heat, ice and topical pain reliever but is not helping.   does work on the computer daily for work.   was driving to work for multiple hours gripping the steering wheel and felt stiffness in her hand.   was also pulling down on a door and states it slipped.  Denies trauma, injury or fall.  No previous injury to right wrist or hand.  Experienced tingling in her fingers as well difficulty with closing hand/fist making.  This has improved today but is still having severe pain in right hand and wrist.    Review of Systems   Constitutional:  Negative for chills, fever and malaise/fatigue.   Musculoskeletal:  Positive for joint pain and myalgias. Negative for falls.   Skin:  Negative for itching and rash.   Neurological:  Negative for tingling, sensory change and weakness.   Endo/Heme/Allergies:  Does not bruise/bleed easily.   All other systems reviewed and are negative.           Objective     BP (!) 146/84 (BP Location: Left arm, Patient Position: Sitting, BP Cuff Size: Adult)   Pulse 88   Temp 36.7 °C (98.1 °F) (Temporal)   Resp 17   Ht (P) 1.575 m (5' 2.01\")   Wt 86.8 kg (191 lb 6.4 oz)   LMP  (LMP Unknown)   SpO2 98%   BMI (P) 35.00 kg/m²      Physical Exam  Vitals reviewed.   Constitutional:       General: She is awake. She is not in acute distress.     Appearance: Normal appearance. She is well-developed. She is not ill-appearing, toxic-appearing or diaphoretic.   HENT:      Head: Normocephalic.   Cardiovascular:      Rate and " Rhythm: Normal rate.   Pulmonary:      Effort: Pulmonary effort is normal.   Musculoskeletal:      Right wrist: Swelling and tenderness present. No deformity, effusion, lacerations, bony tenderness, snuff box tenderness or crepitus. Normal range of motion. Normal pulse.      Right hand: Tenderness present. No swelling, deformity, lacerations or bony tenderness. Normal range of motion. Decreased strength of finger abduction, thumb/finger opposition and wrist extension. Normal sensation. There is no disruption of two-point discrimination. Normal capillary refill. Normal pulse.      Comments: Tenderness on palpation of the ventral aspect of right wrist into distal aspect of control forearm.  Mild swelling on dorsal aspect of right wrist.  Unable to close fist tightly due to discomfort.  Medical assistant applied Velcro right wrist splint.   Skin:     General: Skin is warm and dry.      Findings: No abrasion, bruising, ecchymosis, erythema, signs of injury, laceration or wound.   Neurological:      Mental Status: She is alert and oriented to person, place, and time.   Psychiatric:         Attention and Perception: Attention normal.         Mood and Affect: Mood normal.         Speech: Speech normal.         Behavior: Behavior normal. Behavior is cooperative.                           Assessment & Plan        1. Wrist pain, acute, right    - Referral to Sports Medicine    2. Wrist strain, right, initial encounter    - Referral to Sports Medicine  Patient declined oral steroid at this time, will stick with over-the-counter medications for pain  -May use over the counter NSAID/Tylenol as needed for pain/swelling  -May use cool compresses for swelling as needed  -May utilize RICE method as needed  -Avoid excessive weight bearing to avoid further injury  -May apply topical analgesics as needed   -Perform proper body mechanics with lifting, twisting, bending and walking  -Monitor for deformity, numbness/tingling in toes,  decreased range of motion with weight bearing- need re-evaluation  Follow-up with sports medicine

## 2023-01-13 ENCOUNTER — OFFICE VISIT (OUTPATIENT)
Dept: INTERNAL MEDICINE | Facility: OTHER | Age: 63
End: 2023-01-13
Payer: COMMERCIAL

## 2023-01-13 VITALS
HEIGHT: 62 IN | HEART RATE: 67 BPM | WEIGHT: 186.4 LBS | DIASTOLIC BLOOD PRESSURE: 78 MMHG | SYSTOLIC BLOOD PRESSURE: 122 MMHG | BODY MASS INDEX: 34.3 KG/M2 | TEMPERATURE: 97.2 F | OXYGEN SATURATION: 97 %

## 2023-01-13 DIAGNOSIS — Z87.891 RECENTLY QUIT USING TOBACCO: ICD-10-CM

## 2023-01-13 DIAGNOSIS — E66.09 CLASS 1 OBESITY DUE TO EXCESS CALORIES WITH SERIOUS COMORBIDITY AND BODY MASS INDEX (BMI) OF 34.0 TO 34.9 IN ADULT: ICD-10-CM

## 2023-01-13 DIAGNOSIS — M25.551 HIP PAIN, RIGHT: ICD-10-CM

## 2023-01-13 DIAGNOSIS — M25.531 RIGHT WRIST PAIN: ICD-10-CM

## 2023-01-13 DIAGNOSIS — I10 ESSENTIAL HYPERTENSION: ICD-10-CM

## 2023-01-13 PROBLEM — D75.1 POLYCYTHEMIA: Status: RESOLVED | Noted: 2021-04-19 | Resolved: 2023-01-13

## 2023-01-13 PROCEDURE — 99213 OFFICE O/P EST LOW 20 MIN: CPT | Mod: GE | Performed by: STUDENT IN AN ORGANIZED HEALTH CARE EDUCATION/TRAINING PROGRAM

## 2023-01-13 RX ORDER — IBUPROFEN 200 MG
200 TABLET ORAL EVERY 6 HOURS PRN
COMMUNITY

## 2023-01-13 RX ORDER — CHLORTHALIDONE 25 MG/1
TABLET ORAL
Qty: 90 TABLET | Refills: 2 | Status: SHIPPED | OUTPATIENT
Start: 2023-01-13 | End: 2023-11-10 | Stop reason: SDUPTHER

## 2023-01-13 RX ORDER — ACETAMINOPHEN 500 MG
500-1000 TABLET ORAL EVERY 6 HOURS PRN
COMMUNITY

## 2023-01-13 ASSESSMENT — PATIENT HEALTH QUESTIONNAIRE - PHQ9: CLINICAL INTERPRETATION OF PHQ2 SCORE: 0

## 2023-01-13 ASSESSMENT — FIBROSIS 4 INDEX: FIB4 SCORE: 0.81

## 2023-01-13 NOTE — PROGRESS NOTES
Teaching Physician Attestation      Level of Participation    I discussed with the resident physician the patient's history, exam, assessment and plan in detail.  Topics listed in my addendum were the focus of the visit.  Healthcare maintenance was not addressed this visit unless listed as a topic in my addendum.  I agree with plan as written along with the following additions/modifications:      Wrist pain  -atraumatic, resolved    Hip pain likely 2/2 greater trochanteric pain syndrome  -atraumatic, occurred with increased activity.  Improving with rest/activity mod.  -declines pt formally, home PT exercises given  -Follow clinically    Obesity  -Counseled on diet exercise, healthy eating in a given, recommended [employer] or similar tracking binh.    Htn  -not discussed today but, at goal.  On review of  Dr. Velez's note no lightheadedness, no cp/sob.  Bmp today at goal pm chlorthalidone.  Review of chart serially shows has been at/near goal serially last several visits.  F/u ~3 months.    Hx tobacco use  -quit, remains abstinent.    Return to clinic 3 months.

## 2023-01-13 NOTE — PATIENT INSTRUCTIONS
- check out youtube video : Dr. Hannah barney for hip pain   - Do your exercises while watching TV   - Cut down on sugary foods and processed carbs. Check out [employer] if you would like a more structured program  - Follow up in 4 months w/ Dr. Pantoja

## 2023-01-13 NOTE — PROGRESS NOTES
Simi Msasey is a 62 y.o. female here for Follow-Up (3 month follow up ), Lab Results, Other (Went to [facility] 1/11/23- was there because she couldn't move her hand, her right hand, couldn't bend fingers, wrist hurt horribly, was fine all day at work until end of day. She is unsure of what happened or caused it- gave her a brace, offered her steroids but pt declined- is taking advil for it 1600mg prn), Hypertension, Wrist Pain (Right wrist- not broken- said it was a strain/carpal tunnel ), and Hip Pain (Right hip pain, painful to take steps, walking up stairs is very painful )      HPI:     Patient is a 62 y.o F w/ pmhx of HTN, 60 packs year smoking and dyslipidemia here for follow up of smoking cessation and HTN . She also has c/o right wrist pain and right hip pain     Patient reports doing well overall, continues to abstained from tobacco, no cravings. She has stopped going to the gym as work has been busy. She is looking into diet options with her , states that sugar is her weakness and spending time in front of TV. Encourage balance, healthy diet rather than restrictive diets for sustainability, recommends doing exercise routine while watching TV. Declines nutrition referral    Reports that she had severe wrist pain that started suddenly on Tuesday. She denies any trauma to the wrist. She was unable to use her hand due to the pain. She presented to  the next day, was given wrist brace and advised NSAIDs/Tylenol as needed. She has been alternating between NSAIDs and Tylenol since and has been wearing her wrist brace. Her pain is now significantly improved.     Patient also c/o significant hip pain over the last several weeks. She reports that she has had back and hip on and off for many years. However, her hip pain was more severe about 3 weeks ago. Reports that pain is sharp, on the lateral hip, denies any radiation of pain, worse with walking upstairs and at night. She has been using ice and heat  "to the hip and has been using IBU/Tylenol as above. Reports that last night was the first time she had consistent sleep \"in a while\" due to the pain. She declined physical therapy referral, willing to try home exercises       Current medicines (including changes today)  Current Outpatient Medications   Medication Sig Dispense Refill    ibuprofen (MOTRIN) 200 MG Tab Take 200 mg by mouth every 6 hours as needed. Up to 1600 mg      acetaminophen (TYLENOL) 500 MG Tab Take 500-1,000 mg by mouth every 6 hours as needed. Up to 1600 mg prn      chlorthalidone (HYGROTON) 25 MG Tab Take one tab daily for blood pressure 90 Tablet 2    aspirin (ASA) 325 MG Tab ASPIRIN 325 MG TABS       No current facility-administered medications for this visit.     She  has a past medical history of Anxiety, Back pain, Chickenpox, Depression, [place] measles, Head ache, Hypertension, Influenza, Pulmonary nodule, and Shingles.    She has no past medical history of Breast cancer (HCC).  She  has a past surgical history that includes appendectomy.    Social History     Tobacco Use    Smoking status: Former     Packs/day: 1.00     Years: 44.00     Pack years: 44.00     Types: Cigarettes     Quit date: 2022     Years since quittin.6    Smokeless tobacco: Never    Tobacco comments:     15y @ 3ppd, quit x 2, then 1ppd (avd 1.5 ppd)   Vaping Use    Vaping Use: Never used   Substance Use Topics    Alcohol use: No     Comment: once or twice a year     Drug use: No     Social History     Social History Narrative    Not on file     Family History   Problem Relation Age of Onset    Lung Disease Mother     Cancer Maternal Grandmother         Lung    Breast Cancer Daughter      Family Status   Relation Name Status    Mo Emphysema     Fa      MGMo  (Not Specified)    MGFa          MVA    Sis  Alive    Bro      Ronit DCIS Alive         ROS    The pertinent  ROS findings can be seen in the HPI above.     All other systems " "reviewed and are negative     Objective:     /78 (BP Location: Left arm, Patient Position: Sitting, BP Cuff Size: Adult)   Pulse 67   Temp 36.2 °C (97.2 °F) (Temporal)   Ht 1.575 m (5' 2\")   Wt 84.6 kg (186 lb 6.4 oz)   SpO2 97%  Body mass index is 34.09 kg/m².      Physical Exam:    Constitutional: Alert, no distress.  Skin: No suspicious lesions  Eye: Equal, round and reactive, conjunctiva clear, lids normal.  ENMT: Lips without lesions, good dentition, oropharynx clear.  Neck: Trachea midline, no masses, no thyromegaly. No cervical or supraclavicular lymphadenopathy.  Respiratory: Unlabored respiratory effort, lungs clear to auscultation, no wheezes, no ronchi.  Cardiovascular: Normal S1, S2, no murmur, no edema  Abdomen: Soft, non-tender, no masses, no hepatosplenomegaly.  Musculoskeletal: Adequately aligned spine. ROM intact spine and extremities. No joint erythema or tenderness. Normal muscular development. Normal gait  No tenderness on palpation of the hands, negative tinel's bilaterally, full and equal  strength.   No tenderness on palpation over the GT , straight leg raise negative, ASHLEE test negative, point tenderness over the ITB   Neuro: CN II-XII intact. Reflexes 2+ throughout. Cerebellar testing normal. Normal and equal strength in all extremities  Psych: Mood stable, affect appropriate, thoughts and speech appropriate    Assessment and Plan:   Hip pain, right  - likely secondary to GTS, improving   - continue with ice/heat to the hip, youtube video for home exercises shown, continue with ibuprofen alternating with Tylenol as needed. Avoid ibuprofen and ASA together  - avoid pressure to right hip  - follow up as needed     Right wrist pain  -suspect tendon strain, mostly resolved   -continue with wrist brace at night   - continue with Tylenol/IBU as needed   - ice/heat as needed       Obesity due to excess calories with serious comorbidity  - discussed strategies for adding more " physical activity time   - discussed healthy substitute for sugar/sugary foods   - recommended [email or web address] for more structured program     Essential hypertension  -goal BP: <130/80.  At goal  -RF: age, sodium, family hx  -Meds: chlorthalidone 25mg.  Continue with current medication.   -Lifestyle modification counselling: Weight loss/ Dietary changes/ Sodium restriction <1.5g/d/limit alcohol intake <1 drink/day   - Encourage ongoing tobacco abstinence. Continue home bp monitoring   - Follow up in 4 months to re-establish care      Recently quit using tobacco  - in remission  - encourage ongoing abstinence, patient understands availability of buspar for maintenance, can restart Chantix if more craving/relapse   - encourage continue physical activity, self soothing strategies, mindfulness  - PCV 20 done,  followed by Lung Nodule clinic, last LDCT with multiple noncalcified lung nodules that are stable  - Follow up in 4 months       Instructed to Follow up in clinic or ER for worsening symptoms, difficulty breathing, lack of expected recovery, or should new symptoms or problems arise.    Followup: Return in about 4 months (around 5/13/2023).

## 2023-01-14 NOTE — ASSESSMENT & PLAN NOTE
- likely secondary to GTS, improving   - continue with ice/heat to the hip, youtube video for home exercises shown, continue with ibuprofen alternating with Tylenol as needed. Avoid ibuprofen and ASA together  - avoid pressure to right hip  - follow up as needed

## 2023-01-14 NOTE — ASSESSMENT & PLAN NOTE
- discussed strategies for adding more physical activity time   - discussed healthy substitute for sugar/sugary foods   - recommended [email or web address] for more structured program

## 2023-01-14 NOTE — ASSESSMENT & PLAN NOTE
-suspect tendon strain, mostly resolved   -continue with wrist brace at night   - continue with Tylenol/IBU as needed   - ice/heat as needed

## 2023-01-14 NOTE — ASSESSMENT & PLAN NOTE
- in remission  - encourage ongoing abstinence, patient understands availability of buspar for maintenance, can restart Chantix if more craving/relapse   - encourage continue physical activity, self soothing strategies, mindfulness  - PCV 20 done,  followed by Lung Nodule clinic, last LDCT with multiple noncalcified lung nodules that are stable  - Follow up in 4 months

## 2023-05-08 ENCOUNTER — OFFICE VISIT (OUTPATIENT)
Dept: INTERNAL MEDICINE | Facility: OTHER | Age: 63
End: 2023-05-08
Payer: COMMERCIAL

## 2023-05-08 VITALS
TEMPERATURE: 98 F | HEART RATE: 83 BPM | WEIGHT: 188.6 LBS | HEIGHT: 62 IN | BODY MASS INDEX: 34.71 KG/M2 | SYSTOLIC BLOOD PRESSURE: 143 MMHG | DIASTOLIC BLOOD PRESSURE: 87 MMHG | OXYGEN SATURATION: 96 %

## 2023-05-08 DIAGNOSIS — I10 PRIMARY HYPERTENSION: ICD-10-CM

## 2023-05-08 DIAGNOSIS — M53.3 SACROILIAC JOINT PAIN: ICD-10-CM

## 2023-05-08 PROCEDURE — 99214 OFFICE O/P EST MOD 30 MIN: CPT | Mod: GC

## 2023-05-08 ASSESSMENT — FIBROSIS 4 INDEX: FIB4 SCORE: 0.81

## 2023-05-08 NOTE — PATIENT INSTRUCTIONS
Thank you for visiting today!  Please follow-up in 6 weeks   Please try and eat healthy, get at least 30 minutes of cardiovascular exercise a day to help keep your health as best as it can be.  If you have any questions or concerns please feel free to contact us at [phone].  If you feel like you are experiencing a medical emergency please seek immediate medical attention at an urgent care or in the emergency department.

## 2023-05-08 NOTE — PROGRESS NOTES
Established Patient    Simi Massey is a 63 y.o. female who presents today with the following Chief Complaint(s): Follow up for Diagnoses of Sacroiliac joint pain and Primary hypertension were pertinent to this visit.    HPI:  R Hip pain:   She reported low back/right hip pain that started gradually over the past year. 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 She described her pain as sharp and rated it as an 8/10 when exacerbated and as a 2/10 at rest. She said she has had some relief with stretching it but feels that it hasn't improved overall. She said it is worse with activity. She denied any incontinence, fevers, chills, or point tenderness.     HTN:   At previous encounter with Dr. Velez was at goal with PO <130/80    Recently quit smoking:   She has a 60 pk/yr smoking hx and quit smoking 2022 (almost one year!)     Screening Exams:   -Colonoscopy- completed 2018, recommended 5 year follow up. Repeat will need to be 2023.   -yearly CT scheduled with lung nodule clinic.   -Mammogram completed    -Pap- completed 3/13/2023       Past Medical History:   Diagnosis Date    Anxiety     Back pain     Chickenpox     Depression     Citizen of [place] measles     Head ache     Hypertension     Influenza     Pulmonary nodule     Shingles      Social History     Tobacco Use    Smoking status: Former     Packs/day: 1.00     Years: 44.00     Pack years: 44.00     Types: Cigarettes     Quit date: 2022     Years since quittin.9    Smokeless tobacco: Never    Tobacco comments:     13y @ 3ppd, quit x 2, then 1ppd (avd 1.5 ppd)   Vaping Use    Vaping Use: Never used   Substance Use Topics    Alcohol use: No     Comment: once or twice a year     Drug use: No     Current Outpatient Medications   Medication Sig Dispense Refill    ibuprofen (MOTRIN) 200 MG Tab Take 200 mg by mouth every 6 hours as needed. Up to 1600 mg      acetaminophen  "(TYLENOL) 500 MG Tab Take 500-1,000 mg by mouth every 6 hours as needed. Up to 1600 mg prn      chlorthalidone (HYGROTON) 25 MG Tab Take one tab daily for blood pressure 90 Tablet 2    aspirin (ASA) 325 MG Tab ASPIRIN 325 MG TABS       No current facility-administered medications for this visit.       ROS: As per HPI. Additional pertinent systems as noted below.  Constitutional: Negative for chills and fever.   Respiratory: Negative for cough, hemoptysis, wheezing and stridor.    Cardiovascular: Negative for chest pain, palpitations and leg swelling.   Gastrointestinal: Negative for abdominal pain, constipation, diarrhea, heartburn, nausea and vomiting.   Genitourinary: Negative for dysuria, flank pain and hematuria.   Musculoskeletal: Negative for falls and myalgias.   Skin: Negative for itching and rash.   Neurological: Negative for dizziness, seizures, loss of consciousness and headaches.     Physical Exam  BP (!) 143/87 (BP Location: Left arm, Patient Position: Sitting, BP Cuff Size: Adult)   Pulse 83   Temp 36.7 °C (98 °F) (Temporal)   Ht 1.575 m (5' 2\")   Wt 85.5 kg (188 lb 9.6 oz)   LMP  (LMP Unknown)   SpO2 96%   BMI 34.50 kg/m²     General:  Alert and oriented, No apparent distress.    HEENT: Normocephalic, atraumatic. No scleral icterus. Clear and moist. No erythema or exudates noted.    Lungs: Clear to auscultation. No wheezes, rales, or rhonchi.     Cardiovascular: Regular rate and rhythm. No murmurs, rubs or gallops.    Abdomen:  Regular bowel sounds, nontender, no rebound or guarding noted.    Extremities: No clubbing, cyanosis, edema. Negative straight leg raise test and negative ASHLEE test.     Back: Spine non-tender to palpation. PSIS and ilac crest tender to palpation on right side. L ASIS minimally higher compared to R ASIS.     Skin: Clear. No rash or suspicious skin lesions noted.    Neuro: Aox4, strength 5/5 bilat upper and lower extremities.       Assessment and Plan:   1. Sacroiliac " joint pain  -Pt reported low back/R hip pain that started gradually over the past year and was worsened after going up/down a ladder repetitively.   -She denied any incontinence, fevers, chills, and was not point-tender on exam. Her low back was tender to palpation along R iliac.   Plan:   -Pt declined physical therapy at this time. She was agreeable to home exercises and was provided a handout with specific home exercises.   -recommended weight loss to decrease load on low back.     2. Primary hypertension  Elevated BP of 143/87 with repeat BP of 138/86.   -RF: age, sodium, family hx  -Meds: chlorthalidone 25mg.  Continue with current medication.   -Lifestyle modification counselling: Weight loss/ Dietary changes/ Sodium restriction <1.5g/d/limit alcohol intake <1 drink/day   - Encourage ongoing tobacco abstinence.   -patient agreed to check blood pressure at home and bring blood pressure log to next appointment   -Discussed decreasing salt intake.     Follow up: 6 weeks- 3 months for blood pressure follow up and to check on low back exercises. Referral to GI for colonoscopy at next appointment.     Brady Sam D.O. PGY I  [facility]

## 2023-05-08 NOTE — PROGRESS NOTES
Teaching Physician Attestation      Level of Participation    I have personally interviewed and examined the patient.  In addition, I discussed with the resident physician the patient's history, exam, assessment and plan in detail.  Topics listed in my addendum were the focus of the visit.  Healthcare maintenance was not addressed this visit unless listed as a topic in my addendum.  I agree with the plan as written along with the following additions/modifications:    New Res PCP    PMH: obesity, HTN, prior tobacco use      Right sacroiliitis  -Right lower back pain began with change in activity level (going up and down the ladder repeatedly), no alarm symptoms  -No midline point tenderness on exam, tender to palpation in the right PSIS, neg SSLR b/l    Plan  -offered formal PT< pt delinces.  Home exercises given.  Activity modification.  Encouraged weight loss.    Return to clinic 5 weeks.

## 2023-05-30 ENCOUNTER — OFFICE VISIT (OUTPATIENT)
Dept: URGENT CARE | Facility: CLINIC | Age: 63
End: 2023-05-30
Payer: COMMERCIAL

## 2023-05-30 VITALS
TEMPERATURE: 97.8 F | DIASTOLIC BLOOD PRESSURE: 84 MMHG | WEIGHT: 193.2 LBS | HEART RATE: 98 BPM | OXYGEN SATURATION: 95 % | BODY MASS INDEX: 35.55 KG/M2 | SYSTOLIC BLOOD PRESSURE: 150 MMHG | HEIGHT: 62 IN | RESPIRATION RATE: 16 BRPM

## 2023-05-30 DIAGNOSIS — R35.0 INCREASED URINARY FREQUENCY: ICD-10-CM

## 2023-05-30 DIAGNOSIS — R03.0 ELEVATED BLOOD PRESSURE READING: ICD-10-CM

## 2023-05-30 LAB
APPEARANCE UR: NORMAL
BILIRUB UR STRIP-MCNC: NEGATIVE MG/DL
COLOR UR AUTO: YELLOW
GLUCOSE UR STRIP.AUTO-MCNC: NEGATIVE MG/DL
KETONES UR STRIP.AUTO-MCNC: NEGATIVE MG/DL
LEUKOCYTE ESTERASE UR QL STRIP.AUTO: NEGATIVE
NITRITE UR QL STRIP.AUTO: NEGATIVE
PH UR STRIP.AUTO: 5.5 [PH] (ref 5–8)
PROT UR QL STRIP: NEGATIVE MG/DL
RBC UR QL AUTO: NEGATIVE
SP GR UR STRIP.AUTO: 1.02
UROBILINOGEN UR STRIP-MCNC: 0.2 MG/DL

## 2023-05-30 PROCEDURE — 99213 OFFICE O/P EST LOW 20 MIN: CPT | Performed by: PHYSICIAN ASSISTANT

## 2023-05-30 PROCEDURE — 3077F SYST BP >= 140 MM HG: CPT | Performed by: PHYSICIAN ASSISTANT

## 2023-05-30 PROCEDURE — 3079F DIAST BP 80-89 MM HG: CPT | Performed by: PHYSICIAN ASSISTANT

## 2023-05-30 PROCEDURE — 81002 URINALYSIS NONAUTO W/O SCOPE: CPT | Performed by: PHYSICIAN ASSISTANT

## 2023-05-30 ASSESSMENT — ENCOUNTER SYMPTOMS
FEVER: 0
CHILLS: 0
NAUSEA: 0
FLANK PAIN: 0
VOMITING: 0

## 2023-05-30 ASSESSMENT — FIBROSIS 4 INDEX: FIB4 SCORE: 0.81

## 2023-05-30 NOTE — PROGRESS NOTES
Subjective:   Simi Massey is a 63 y.o. female who presents for Urinary Frequency (X 3-4 )        Patient is with concerns of urinary frequency that was fairly significant over the weekend but is improved over the last couple days.  She was initially waking up multiple times at night to urinate.  She denies burning with urination, blood in her urine, abnormal vaginal discharge, vaginal irritation or rashes, nausea, vomiting, fevers, chills, abdominal pain.  She states that she does drink a lot of water at baseline.  She is not taking any medications for her symptoms.      Review of Systems   Constitutional:  Negative for chills and fever.   Gastrointestinal:  Negative for nausea and vomiting.   Genitourinary:  Positive for frequency and urgency. Negative for dysuria, flank pain and hematuria.       PMH:  has a past medical history of Anxiety, Back pain, Chickenpox, Depression, [place] measles, Head ache, Hypertension, Influenza, Pulmonary nodule, and Shingles.    She has no past medical history of Breast cancer (HCC).  MEDS:   Current Outpatient Medications:     ibuprofen (MOTRIN) 200 MG Tab, Take 200 mg by mouth every 6 hours as needed. Up to 1600 mg, Disp: , Rfl:     acetaminophen (TYLENOL) 500 MG Tab, Take 500-1,000 mg by mouth every 6 hours as needed. Up to 1600 mg prn, Disp: , Rfl:     chlorthalidone (HYGROTON) 25 MG Tab, Take one tab daily for blood pressure, Disp: 90 Tablet, Rfl: 2    aspirin (ASA) 325 MG Tab, ASPIRIN 325 MG TABS, Disp: , Rfl:   ALLERGIES:   Allergies   Allergen Reactions    Nkda [No Known Drug Allergy]      SURGHX:   Past Surgical History:   Procedure Laterality Date    APPENDECTOMY      1965     SOCHX:  reports that she quit smoking about a year ago. Her smoking use included cigarettes. She has a 44.00 pack-year smoking history. She has never used smokeless tobacco. She reports that she does not drink alcohol and does not use drugs.  FH: Family history was reviewed, no pertinent findings  "to report   Objective:   BP (!) 150/84   Pulse 98   Temp 36.6 °C (97.8 °F) (Temporal)   Resp 16   Ht 1.575 m (5' 2\") Comment: per pt  Wt 87.6 kg (193 lb 3.2 oz) Comment: w shoes  LMP  (LMP Unknown)   SpO2 95%   BMI 35.34 kg/m²   Physical Exam  Vitals reviewed.   Constitutional:       General: She is not in acute distress.     Appearance: Normal appearance. She is well-developed. She is not toxic-appearing.   HENT:      Head: Normocephalic and atraumatic.      Right Ear: External ear normal.      Left Ear: External ear normal.      Nose: Nose normal.   Cardiovascular:      Rate and Rhythm: Normal rate and regular rhythm.   Pulmonary:      Effort: Pulmonary effort is normal. No respiratory distress.      Breath sounds: No stridor.   Skin:     General: Skin is dry.   Neurological:      Comments: Alert and oriented.    Psychiatric:         Speech: Speech normal.         Behavior: Behavior normal.           Assessment/Plan:   1. Increased urinary frequency  - POCT Urinalysis    2. Elevated blood pressure reading    UA within normal limits.  Exact cause unclear.  Patient is not experiencing any vaginal symptoms.  Patient declines urine culture and further evaluation at this time.  Recommend that she be immediately reevaluated with any new or worsening symptoms.  Blood pressure mildly elevated today.  Continue to monitor and follow-up with PCP for further evaluation management.  "

## 2023-11-10 ENCOUNTER — APPOINTMENT (RX ONLY)
Dept: URBAN - METROPOLITAN AREA CLINIC 20 | Facility: CLINIC | Age: 63
Setting detail: DERMATOLOGY
End: 2023-11-10

## 2023-11-10 ENCOUNTER — PATIENT MESSAGE (OUTPATIENT)
Dept: INTERNAL MEDICINE | Facility: OTHER | Age: 63
End: 2023-11-10

## 2023-11-10 ENCOUNTER — OFFICE VISIT (OUTPATIENT)
Dept: INTERNAL MEDICINE | Facility: OTHER | Age: 63
End: 2023-11-10
Payer: COMMERCIAL

## 2023-11-10 VITALS
BODY MASS INDEX: 34.01 KG/M2 | HEART RATE: 79 BPM | OXYGEN SATURATION: 94 % | DIASTOLIC BLOOD PRESSURE: 78 MMHG | WEIGHT: 184.8 LBS | HEIGHT: 62 IN | TEMPERATURE: 97.1 F | SYSTOLIC BLOOD PRESSURE: 128 MMHG

## 2023-11-10 DIAGNOSIS — Z00.00 ENCOUNTER FOR PREVENTIVE HEALTH EXAMINATION: ICD-10-CM

## 2023-11-10 DIAGNOSIS — L81.4 OTHER MELANIN HYPERPIGMENTATION: ICD-10-CM

## 2023-11-10 DIAGNOSIS — H93.A2 PULSATILE TINNITUS OF LEFT EAR: ICD-10-CM

## 2023-11-10 DIAGNOSIS — I10 ESSENTIAL HYPERTENSION: ICD-10-CM

## 2023-11-10 DIAGNOSIS — Z12.2 SCREENING FOR LUNG CANCER: ICD-10-CM

## 2023-11-10 DIAGNOSIS — D22 MELANOCYTIC NEVI: ICD-10-CM

## 2023-11-10 DIAGNOSIS — D18.0 HEMANGIOMA: ICD-10-CM

## 2023-11-10 DIAGNOSIS — R91.1 PULMONARY NODULE: ICD-10-CM

## 2023-11-10 DIAGNOSIS — E78.5 DYSLIPIDEMIA: ICD-10-CM

## 2023-11-10 DIAGNOSIS — Z23 NEED FOR VACCINATION: ICD-10-CM

## 2023-11-10 DIAGNOSIS — I45.9 SKIPPED BEATS: ICD-10-CM

## 2023-11-10 DIAGNOSIS — L82.1 OTHER SEBORRHEIC KERATOSIS: ICD-10-CM

## 2023-11-10 DIAGNOSIS — Z71.89 OTHER SPECIFIED COUNSELING: ICD-10-CM

## 2023-11-10 PROBLEM — D22.61 MELANOCYTIC NEVI OF RIGHT UPPER LIMB, INCLUDING SHOULDER: Status: ACTIVE | Noted: 2023-11-10

## 2023-11-10 PROBLEM — D22.71 MELANOCYTIC NEVI OF RIGHT LOWER LIMB, INCLUDING HIP: Status: ACTIVE | Noted: 2023-11-10

## 2023-11-10 PROBLEM — D22.72 MELANOCYTIC NEVI OF LEFT LOWER LIMB, INCLUDING HIP: Status: ACTIVE | Noted: 2023-11-10

## 2023-11-10 PROBLEM — D22.39 MELANOCYTIC NEVI OF OTHER PARTS OF FACE: Status: ACTIVE | Noted: 2023-11-10

## 2023-11-10 PROBLEM — D22.62 MELANOCYTIC NEVI OF LEFT UPPER LIMB, INCLUDING SHOULDER: Status: ACTIVE | Noted: 2023-11-10

## 2023-11-10 PROBLEM — D18.01 HEMANGIOMA OF SKIN AND SUBCUTANEOUS TISSUE: Status: ACTIVE | Noted: 2023-11-10

## 2023-11-10 PROBLEM — D22.5 MELANOCYTIC NEVI OF TRUNK: Status: ACTIVE | Noted: 2023-11-10

## 2023-11-10 PROCEDURE — 90715 TDAP VACCINE 7 YRS/> IM: CPT | Performed by: INTERNAL MEDICINE

## 2023-11-10 PROCEDURE — 99213 OFFICE O/P EST LOW 20 MIN: CPT

## 2023-11-10 PROCEDURE — 90471 IMMUNIZATION ADMIN: CPT | Performed by: INTERNAL MEDICINE

## 2023-11-10 PROCEDURE — 3078F DIAST BP <80 MM HG: CPT | Performed by: INTERNAL MEDICINE

## 2023-11-10 PROCEDURE — 99214 OFFICE O/P EST MOD 30 MIN: CPT | Mod: 25 | Performed by: INTERNAL MEDICINE

## 2023-11-10 PROCEDURE — ? COUNSELING

## 2023-11-10 PROCEDURE — ? SUNSCREEN RECOMMENDATIONS

## 2023-11-10 PROCEDURE — 3074F SYST BP LT 130 MM HG: CPT | Performed by: INTERNAL MEDICINE

## 2023-11-10 RX ORDER — CHLORTHALIDONE 25 MG/1
TABLET ORAL
Qty: 90 TABLET | Refills: 3 | Status: SHIPPED | OUTPATIENT
Start: 2023-11-10

## 2023-11-10 ASSESSMENT — LOCATION DETAILED DESCRIPTION DERM
LOCATION DETAILED: INFERIOR THORACIC SPINE
LOCATION DETAILED: LEFT DISTAL POSTERIOR THIGH
LOCATION DETAILED: RIGHT SUPERIOR UPPER BACK
LOCATION DETAILED: LEFT LATERAL PROXIMAL PRETIBIAL REGION
LOCATION DETAILED: LEFT VENTRAL PROXIMAL FOREARM
LOCATION DETAILED: RIGHT DISTAL POSTERIOR UPPER ARM
LOCATION DETAILED: RIGHT PROXIMAL PRETIBIAL REGION
LOCATION DETAILED: LEFT PROXIMAL POSTERIOR UPPER ARM
LOCATION DETAILED: RIGHT INFERIOR CENTRAL MALAR CHEEK
LOCATION DETAILED: RIGHT VENTRAL PROXIMAL FOREARM
LOCATION DETAILED: RIGHT INFERIOR MEDIAL MIDBACK
LOCATION DETAILED: RIGHT INFERIOR FOREHEAD
LOCATION DETAILED: RIGHT DISTAL POSTERIOR THIGH
LOCATION DETAILED: RIGHT INFERIOR MEDIAL UPPER BACK

## 2023-11-10 ASSESSMENT — LOCATION SIMPLE DESCRIPTION DERM
LOCATION SIMPLE: RIGHT FOREARM
LOCATION SIMPLE: LEFT FOREARM
LOCATION SIMPLE: LEFT POSTERIOR THIGH
LOCATION SIMPLE: RIGHT POSTERIOR THIGH
LOCATION SIMPLE: UPPER BACK
LOCATION SIMPLE: RIGHT LOWER BACK
LOCATION SIMPLE: LEFT POSTERIOR UPPER ARM
LOCATION SIMPLE: RIGHT FOREHEAD
LOCATION SIMPLE: RIGHT UPPER BACK
LOCATION SIMPLE: RIGHT POSTERIOR UPPER ARM
LOCATION SIMPLE: RIGHT CHEEK
LOCATION SIMPLE: RIGHT PRETIBIAL REGION
LOCATION SIMPLE: LEFT PRETIBIAL REGION

## 2023-11-10 ASSESSMENT — LOCATION ZONE DERM
LOCATION ZONE: LEG
LOCATION ZONE: FACE
LOCATION ZONE: TRUNK
LOCATION ZONE: ARM

## 2023-11-10 ASSESSMENT — FIBROSIS 4 INDEX: FIB4 SCORE: 0.81

## 2023-11-10 NOTE — PROGRESS NOTES
Chief Complaint   Patient presents with    Hypertension     Medication renewal.        HPI: Simi Massey is a 63 y.o. female with past medical history as below who presented to the clinic for the following.      *Dyslipidemia  - last labs from march 2022, pending repeat   *Tobacco use disorder in remission   - may 2022.  -Lung cancer screening  Pulmonary nodules > 1 year ago- patient will reach out to the program to follow up on annual CT screening. If unable to schedule then will contact us.   *Hypertension-well controlled today   -requesting for refill of chlorthalidone  *Obesity  *Prediabetes last A1c from 2018 was 5.9  Microalbumin from 2021   -lost weight since   *COLD sores   -Managing with OTC remedies   *Hears heart beat when lying down on left side   -Has been on going for many years   No dizziness, lightheadedness, vision changes , other ear symptoms   No bothering her   She has only tried sleeping on her left hence unable to tell If it is present on right too, She will pay attention   *Recent fall   -Mechanical, fell on chest - since then has been feeling some skipped beats - frequency has decreased now. No associated symptoms   Patient feels that it might be associated with anxiety         Preventative health   *Family history of breast cancer  -in daughter  -Patient was tested for gene,   Son also positive for gene hence likely from dads side.   *Abnormal findings on mammogram  -most recent in 2022 was normal   -Patient will schedule her mammogram next year   Does have pap smears regularly through gynecology  Recently had colonoscopy  in November 2023 - scanned into file, showed mild diverticulosis with internal and external hemorrhoids- follow up recommended in 7 years as per patient   Sees eye doctor, dentist and dermatologist regularly   Declines - flu vaccine and covid vaccine - both made patient very sick in the past   Open to get tdap on this visit - given   PCV - obtained earlier    Shingles pending - patient states that she has had shingles twice in the past - mild - encouraged to get vaccine - patient will be getting vaccine      *Elevated alkaline phosphatase  -with normal ast alt t bili  in the past   -only mid elevation   -No abdominal symptoms      Other problems not discussed on this visit obtained from review of chart include.   *Hip pain right  *Right wrist pain  *Sleep disordered breathing  *Mood disorder  *Menopause                  Patient Active Problem List    Diagnosis Date Noted    Pulsatile tinnitus of left ear 11/10/2023    Skipped beats 11/10/2023    Obesity due to excess calories with serious comorbidity 01/13/2023    Hip pain, right 01/13/2023    Right wrist pain 01/13/2023    Pulmonary nodules 09/12/2022    Other abnormal and inconclusive findings on diagnostic imaging of breast 10/17/2019    Family history of breast cancer 12/13/2018    Mass of left breast on mammogram 07/30/2018    Mood disorder (HCC) 01/18/2013    Essential hypertension 10/12/2012    Menopause 10/12/2012    Sleep-disordered breathing 10/12/2012    Dyslipidemia 09/07/2012    Recently quit using tobacco 09/07/2012    Encounter for preventive health examination 09/07/2012       Current Outpatient Medications   Medication Sig Dispense Refill    Red Yeast Rice Extract (RED YEAST RICE PO) Take  by mouth.      Coenzyme Q10 (COQ10 PO) Take  by mouth.      Multiple Vitamins-Minerals (OCUVITE PO) Take  by mouth.      Alpha-Lipoic Acid (LIPOIC ACID PO) Take  by mouth.      Cholecalciferol (D3 ADULT PO) Take  by mouth.      B Complex-Biotin-FA (B-COMPLEX PO) Take  by mouth.      chlorthalidone (HYGROTON) 25 MG Tab Take one tab daily for blood pressure 90 Tablet 3    ibuprofen (MOTRIN) 200 MG Tab Take 200 mg by mouth every 6 hours as needed. Up to 1600 mg      acetaminophen (TYLENOL) 500 MG Tab Take 500-1,000 mg by mouth every 6 hours as needed. Up to 1600 mg prn      aspirin (ASA) 325 MG Tab ASPIRIN 325 MG TABS    "    No current facility-administered medications for this visit.       ROS: As per HPI. Additional pertinent systems as noted below.  Constitutional:  Negative for fever, chills   Cardiovascular:  Negative for chest pain, palpitations   Respiratory:  Negative for cough, sputum production, shortness of breath       /78 (BP Location: Right arm, Patient Position: Sitting, BP Cuff Size: Adult)   Pulse 79   Temp 36.2 °C (97.1 °F) (Temporal)   Ht 1.575 m (5' 2\")   Wt 83.8 kg (184 lb 12.8 oz)   LMP  (LMP Unknown)   SpO2 94%   BMI 33.80 kg/m²     Physical Exam   Constitutional:  Well developed, well nourished. Not in acute distress   HENT:  Normocephalic, Atraumatic, Oropharynx is pink and moist. No oral exudates, Nose normal    Cardiovascular:  Regular rate and rhythm, No pedal edema, Intact distal pulses   Respiratory: Clear to auscultation bilaterally, No use of accessory muscles       Note: I have reviewed all pertinent labs and diagnostic tests associated with this visit with specific comments listed under the assessment and plan below    Assessment and Plan    1. Essential hypertension  Well controlled on chlorthalidone  - Comp Metabolic Panel; Future  - chlorthalidone (HYGROTON) 25 MG Tab; Take one tab daily for blood pressure  Dispense: 90 Tablet; Refill: 3    2. Dyslipidemia    - Lipid Profile; Future    3. Pulsatile tinnitus of left ear  - states that she hear heart beat on left ear   -Stable over ears  -Not concerning to patient at this time, no ear symptoms  -Note only when lying down   -Discussed with patient regarding possible vascular etiologies and to monitor other side as well.   -Will pursue imaging if changing in character or getting worse     4. Skipped beats  Since fall   Not associated with symptoms   Likely PVCs- improving - encouraged patient to reach out if becoming more frequent or getting associated with symptoms    5. Need for vaccination    - Tdap Vaccine =>8YO IM    6. Encounter " for preventive health examination  *Family history of breast cancer  -in daughter  -Patient was tested for gene,   Son also positive for gene hence likely from dads side.   *Abnormal findings on mammogram  -most recent in 2022 was normal   -Patient will schedule her mammogram next year   Does have pap smears regularly through gynecology  Recently had colonoscopy  in November 2023 - scanned into file, showed mild diverticulosis with internal and external hemorrhoids- follow up recommended in 7 years as per patient   Sees eye doctor, dentist and dermatologist regularly   Declines - flu vaccine and covid vaccine - both made patient very sick in the past   Open to get tdap on this visit - given   PCV - obtained earlier   Shingles pending - patient states that she has had shingles twice in the past - mild - encouraged to get vaccine - patient will be getting vaccine     Followup: Return in about 6 months (around 5/10/2024).        Signed by: Winston Kilpatrick M.D.

## 2023-11-29 ENCOUNTER — HOSPITAL ENCOUNTER (OUTPATIENT)
Dept: RADIOLOGY | Facility: MEDICAL CENTER | Age: 63
End: 2023-11-29
Attending: INTERNAL MEDICINE
Payer: COMMERCIAL

## 2023-11-29 DIAGNOSIS — R91.1 PULMONARY NODULE: ICD-10-CM

## 2023-11-29 DIAGNOSIS — Z12.2 SCREENING FOR LUNG CANCER: ICD-10-CM

## 2023-11-29 PROCEDURE — 71271 CT THORAX LUNG CANCER SCR C-: CPT

## 2024-01-09 ENCOUNTER — OFFICE VISIT (OUTPATIENT)
Dept: SLEEP MEDICINE | Facility: MEDICAL CENTER | Age: 64
End: 2024-01-09
Attending: INTERNAL MEDICINE
Payer: COMMERCIAL

## 2024-01-09 VITALS
SYSTOLIC BLOOD PRESSURE: 138 MMHG | OXYGEN SATURATION: 100 % | WEIGHT: 183 LBS | BODY MASS INDEX: 34.55 KG/M2 | HEIGHT: 61 IN | HEART RATE: 81 BPM | DIASTOLIC BLOOD PRESSURE: 80 MMHG

## 2024-01-09 DIAGNOSIS — Z87.891 RECENTLY QUIT USING TOBACCO: ICD-10-CM

## 2024-01-09 DIAGNOSIS — R91.8 PULMONARY NODULES: ICD-10-CM

## 2024-01-09 PROCEDURE — 99214 OFFICE O/P EST MOD 30 MIN: CPT | Performed by: INTERNAL MEDICINE

## 2024-01-09 PROCEDURE — 3079F DIAST BP 80-89 MM HG: CPT | Performed by: INTERNAL MEDICINE

## 2024-01-09 PROCEDURE — 3075F SYST BP GE 130 - 139MM HG: CPT | Performed by: INTERNAL MEDICINE

## 2024-01-09 PROCEDURE — 99212 OFFICE O/P EST SF 10 MIN: CPT | Performed by: INTERNAL MEDICINE

## 2024-01-09 ASSESSMENT — ENCOUNTER SYMPTOMS
ORTHOPNEA: 0
WHEEZING: 0
HEADACHES: 0
COUGH: 0
SHORTNESS OF BREATH: 0
DIZZINESS: 0
FEVER: 0
NAUSEA: 0
CHILLS: 0
VOMITING: 0
SPUTUM PRODUCTION: 0

## 2024-01-09 ASSESSMENT — FIBROSIS 4 INDEX: FIB4 SCORE: 0.77

## 2024-01-09 ASSESSMENT — PATIENT HEALTH QUESTIONNAIRE - PHQ9: CLINICAL INTERPRETATION OF PHQ2 SCORE: 0

## 2024-01-09 NOTE — PROGRESS NOTES
Pulmonary Clinic Note    Chief Complaint:  Chief Complaint   Patient presents with    Follow-Up     Pulmonary nodules. Last Seen 22 w/ Nel Borja      Results     CT-Lung 23       HPI:   This patient is a 64 y.o. female whom is followed in our clinic for Pulmonary nodules last seen by Huong on 22.  Patient has a history of tobacco abuse, which is better remission for the past 2 years.  She has been followed for lung cancer screening and was noted to have small pulmonary nodules started back in 2019.  Annual CT scanning has shown that these nodules are stable, most recent CT maximum dimension of nodule is 6 mm.  Patient reports that she has continued to abstain from tobacco.    Past Medical History:   Diagnosis Date    Anxiety     Back pain     Chickenpox     Depression     [place] measles     Head ache     Hypertension     Influenza     Pulmonary nodule     Shingles        Social History     Socioeconomic History    Marital status:      Spouse name: Not on file    Number of children: Not on file    Years of education: Not on file    Highest education level: Not on file   Occupational History    Not on file   Tobacco Use    Smoking status: Former     Current packs/day: 0.00     Average packs/day: 1 pack/day for 44.0 years (44.0 ttl pk-yrs)     Types: Cigarettes     Start date: 1978     Quit date: 2022     Years since quittin.6    Smokeless tobacco: Never    Tobacco comments:     15y @ 3ppd, quit x 2, then 1ppd (avd 1.5 ppd)   Vaping Use    Vaping Use: Never used   Substance and Sexual Activity    Alcohol use: No     Comment: once or twice a year     Drug use: No    Sexual activity: Yes     Partners: Male   Other Topics Concern    Not on file   Social History Narrative    Not on file     Social Determinants of Health     Financial Resource Strain: Not on file   Food Insecurity: Not on file   Transportation Needs: Not on file   Physical Activity: Not on file   Stress: Not on  "file   Social Connections: Not on file   Intimate Partner Violence: Not on file   Housing Stability: Not on file            Current Outpatient Medications on File Prior to Visit   Medication Sig Dispense Refill    Red Yeast Rice Extract (RED YEAST RICE PO) Take  by mouth.      Coenzyme Q10 (COQ10 PO) Take  by mouth.      Multiple Vitamins-Minerals (OCUVITE PO) Take  by mouth.      Alpha-Lipoic Acid (LIPOIC ACID PO) Take  by mouth.      Cholecalciferol (D3 ADULT PO) Take  by mouth.      B Complex-Biotin-FA (B-COMPLEX PO) Take  by mouth.      chlorthalidone (HYGROTON) 25 MG Tab Take one tab daily for blood pressure 90 Tablet 3    ibuprofen (MOTRIN) 200 MG Tab Take 200 mg by mouth every 6 hours as needed. Up to 1600 mg      acetaminophen (TYLENOL) 500 MG Tab Take 500-1,000 mg by mouth every 6 hours as needed. Up to 1600 mg prn      aspirin (ASA) 325 MG Tab ASPIRIN 325 MG TABS       No current facility-administered medications on file prior to visit.       Review of Systems   Constitutional:  Negative for chills, fever and malaise/fatigue.   Respiratory:  Negative for cough, sputum production, shortness of breath and wheezing.    Cardiovascular:  Negative for chest pain and orthopnea.   Gastrointestinal:  Negative for nausea and vomiting.   Neurological:  Negative for dizziness and headaches.       Vitals:  /80 (BP Location: Right arm, Patient Position: Sitting, BP Cuff Size: Adult)   Pulse 81   Ht 1.549 m (5' 1\")   Wt 83 kg (183 lb)   SpO2 100%     Physical Exam  Vitals and nursing note reviewed.   Constitutional:       Appearance: Normal appearance.   HENT:      Head: Normocephalic.   Eyes:      Conjunctiva/sclera: Conjunctivae normal.   Cardiovascular:      Rate and Rhythm: Normal rate and regular rhythm.   Pulmonary:      Effort: Pulmonary effort is normal.      Breath sounds: Normal breath sounds.   Skin:     General: Skin is warm and dry.   Neurological:      General: No focal deficit present.      " Mental Status: She is alert and oriented to person, place, and time.       Pertinent Studies:    PFTs as reviewed by me personally show:    Imaging as reviewed by me personally show:    11/29/2023 CT chest   1. Unchanged right middle lobe nodules, measuring up to 6 mm.  2.  No new or suspicious pulmonary nodule.    Echo:    Assessment/Plan:  Problem List Items Addressed This Visit       Recently quit using tobacco     Continue with annual lung cancer screening, shared decision making visit has already been completed         Relevant Orders    CT-LUNG CANCER-SCREENING    Pulmonary nodules     Patient has had stable pulmonary nodules of 6.5 mm or less since 2019  CT from November 2023 demonstrates stability    Continue with annual lung cancer screening, patient quit smoking 2 years ago            Return in about 1 year (around 1/9/2025).      Time spent in record review prior to patient arrival, reviewing results, and in face-to-face encounter totaled 38 min, excluding any procedures if performed.    Ya Whiteside, DO   Pulmonary and Critical Care

## 2024-01-09 NOTE — ASSESSMENT & PLAN NOTE
Patient has had stable pulmonary nodules of 6.5 mm or less since 2019  CT from November 2023 demonstrates stability    Continue with annual lung cancer screening, patient quit smoking 2 years ago

## 2024-01-09 NOTE — ASSESSMENT & PLAN NOTE
Continue with annual lung cancer screening, shared decision making visit has already been completed

## 2024-04-23 ENCOUNTER — OFFICE VISIT (OUTPATIENT)
Dept: URGENT CARE | Facility: PHYSICIAN GROUP | Age: 64
End: 2024-04-23
Payer: COMMERCIAL

## 2024-04-23 VITALS
OXYGEN SATURATION: 94 % | HEIGHT: 61 IN | SYSTOLIC BLOOD PRESSURE: 140 MMHG | HEART RATE: 102 BPM | BODY MASS INDEX: 34.93 KG/M2 | DIASTOLIC BLOOD PRESSURE: 80 MMHG | TEMPERATURE: 97.4 F | RESPIRATION RATE: 16 BRPM | WEIGHT: 185 LBS

## 2024-04-23 DIAGNOSIS — H61.21 IMPACTED CERUMEN OF RIGHT EAR: ICD-10-CM

## 2024-04-23 DIAGNOSIS — H92.01 OTALGIA OF RIGHT EAR: ICD-10-CM

## 2024-04-23 DIAGNOSIS — J02.9 SORE THROAT: ICD-10-CM

## 2024-04-23 DIAGNOSIS — H66.001 NON-RECURRENT ACUTE SUPPURATIVE OTITIS MEDIA OF RIGHT EAR WITHOUT SPONTANEOUS RUPTURE OF TYMPANIC MEMBRANE: ICD-10-CM

## 2024-04-23 LAB — S PYO DNA SPEC NAA+PROBE: NOT DETECTED

## 2024-04-23 PROCEDURE — 3077F SYST BP >= 140 MM HG: CPT | Performed by: PHYSICIAN ASSISTANT

## 2024-04-23 PROCEDURE — 87651 STREP A DNA AMP PROBE: CPT | Performed by: PHYSICIAN ASSISTANT

## 2024-04-23 PROCEDURE — 3079F DIAST BP 80-89 MM HG: CPT | Performed by: PHYSICIAN ASSISTANT

## 2024-04-23 PROCEDURE — 99213 OFFICE O/P EST LOW 20 MIN: CPT | Performed by: PHYSICIAN ASSISTANT

## 2024-04-23 RX ORDER — AMOXICILLIN AND CLAVULANATE POTASSIUM 875; 125 MG/1; MG/1
1 TABLET, FILM COATED ORAL 2 TIMES DAILY
Qty: 14 TABLET | Refills: 0 | Status: SHIPPED | OUTPATIENT
Start: 2024-04-23 | End: 2024-04-30

## 2024-04-23 ASSESSMENT — FIBROSIS 4 INDEX: FIB4 SCORE: 0.77

## 2024-04-25 ASSESSMENT — ENCOUNTER SYMPTOMS
COUGH: 0
CHILLS: 0
SORE THROAT: 1
MYALGIAS: 0
FEVER: 0

## 2024-04-25 NOTE — PROGRESS NOTES
Subjective     Simi Massey is a 64 y.o. female who presents with Earache (R ear, sore throat, onset today )    HPI:  Simi Massey is a 64 y.o. female who presents today for evaluation of sore throat and ear pain. She started to have right ear pain and right sided throat pain this morning. Right ear feels full and painful and worsens when she swallows. No fever/chills, cough, or significant congestion.        Review of Systems   Constitutional:  Negative for chills and fever.   HENT:  Positive for ear pain and sore throat. Negative for congestion.    Respiratory:  Negative for cough.    Musculoskeletal:  Negative for myalgias.         PMH:  has a past medical history of Anxiety, Back pain, Chickenpox, Depression, [place] measles, Head ache, Hypertension, Influenza, Pulmonary nodule, and Shingles.    She has no past medical history of Breast cancer (HCC), Cough, Painful breathing, Shortness of breath, Sputum production, or Wheezing.  MEDS:   Current Outpatient Medications:     amoxicillin-clavulanate (AUGMENTIN) 875-125 MG Tab, Take 1 Tablet by mouth 2 times a day for 7 days., Disp: 14 Tablet, Rfl: 0    Red Yeast Rice Extract (RED YEAST RICE PO), Take  by mouth., Disp: , Rfl:     Coenzyme Q10 (COQ10 PO), Take  by mouth., Disp: , Rfl:     Multiple Vitamins-Minerals (OCUVITE PO), Take  by mouth., Disp: , Rfl:     Alpha-Lipoic Acid (LIPOIC ACID PO), Take  by mouth., Disp: , Rfl:     Cholecalciferol (D3 ADULT PO), Take  by mouth., Disp: , Rfl:     B Complex-Biotin-FA (B-COMPLEX PO), Take  by mouth., Disp: , Rfl:     chlorthalidone (HYGROTON) 25 MG Tab, Take one tab daily for blood pressure, Disp: 90 Tablet, Rfl: 3    ibuprofen (MOTRIN) 200 MG Tab, Take 200 mg by mouth every 6 hours as needed. Up to 1600 mg, Disp: , Rfl:     acetaminophen (TYLENOL) 500 MG Tab, Take 500-1,000 mg by mouth every 6 hours as needed. Up to 1600 mg prn, Disp: , Rfl:     aspirin (ASA) 325 MG Tab, ASPIRIN 325 MG TABS, Disp: , Rfl:  "  ALLERGIES:   Allergies   Allergen Reactions    Nkda [No Known Drug Allergy]      SURGHX:   Past Surgical History:   Procedure Laterality Date    APPENDECTOMY      1965     SOCHX:  reports that she quit smoking about 22 months ago. Her smoking use included cigarettes. She started smoking about 45 years ago. She has a 44 pack-year smoking history. She has never used smokeless tobacco. She reports that she does not drink alcohol and does not use drugs.  FH: Family history was reviewed, no pertinent findings to report      Objective     BP (!) 140/80 (BP Location: Right arm, Patient Position: Sitting, BP Cuff Size: Adult)   Pulse (!) 102   Temp 36.3 °C (97.4 °F) (Temporal)   Resp 16   Ht 1.549 m (5' 1\")   Wt 83.9 kg (185 lb)   LMP  (LMP Unknown)   SpO2 94%   BMI 34.96 kg/m²      Physical Exam  Constitutional:       General: She is not in acute distress.     Appearance: She is not diaphoretic.   HENT:      Head: Normocephalic and atraumatic.      Right Ear: External ear normal. There is impacted cerumen.      Left Ear: Tympanic membrane, ear canal and external ear normal.      Nose: Mucosal edema present. No congestion or rhinorrhea.      Mouth/Throat:      Lips: Pink.      Mouth: Mucous membranes are moist.      Pharynx: Oropharynx is clear.   Eyes:      Conjunctiva/sclera: Conjunctivae normal.      Pupils: Pupils are equal, round, and reactive to light.   Pulmonary:      Effort: Pulmonary effort is normal. No respiratory distress.   Musculoskeletal:      Cervical back: Normal range of motion.   Skin:     Findings: No rash.   Neurological:      Mental Status: She is alert and oriented to person, place, and time.   Psychiatric:         Mood and Affect: Mood and affect normal.         Cognition and Memory: Memory normal.         Judgment: Judgment normal.         POCT GROUP A STREP, PCR - Negative      Procedure: Cerumen Removal  Cerumen removed with lavage   Patient tolerated well  Post procedure exam with " clear canal. TM is erythematous and bulging  Assessment & Plan       1. Sore throat  - POCT GROUP A STREP, PCR    2. Impacted cerumen of right ear    3. Otalgia of right ear    4. Non-recurrent acute suppurative otitis media of right ear without spontaneous rupture of tympanic membrane  - amoxicillin-clavulanate (AUGMENTIN) 875-125 MG Tab; Take 1 Tablet by mouth 2 times a day for 7 days.  Dispense: 14 Tablet; Refill: 0               Differential Diagnosis, natural history, and supportive care discussed. Return to the Urgent Care or follow up with your PCP if symptoms fail to resolve, or for any new or worsening symptoms. Emergency room precautions discussed. Patient and/or family appears understanding of information.

## 2024-05-20 ENCOUNTER — OFFICE VISIT (OUTPATIENT)
Dept: INTERNAL MEDICINE | Facility: OTHER | Age: 64
End: 2024-05-20
Payer: COMMERCIAL

## 2024-05-20 VITALS
BODY MASS INDEX: 35.19 KG/M2 | HEIGHT: 61 IN | TEMPERATURE: 98.6 F | SYSTOLIC BLOOD PRESSURE: 130 MMHG | DIASTOLIC BLOOD PRESSURE: 82 MMHG | OXYGEN SATURATION: 95 % | WEIGHT: 186.4 LBS | HEART RATE: 78 BPM

## 2024-05-20 DIAGNOSIS — M25.571 CHRONIC PAIN OF RIGHT ANKLE: ICD-10-CM

## 2024-05-20 DIAGNOSIS — I10 ESSENTIAL HYPERTENSION: ICD-10-CM

## 2024-05-20 DIAGNOSIS — E78.5 HYPERLIPIDEMIA, UNSPECIFIED HYPERLIPIDEMIA TYPE: ICD-10-CM

## 2024-05-20 DIAGNOSIS — G89.29 CHRONIC PAIN OF RIGHT ANKLE: ICD-10-CM

## 2024-05-20 PROCEDURE — 3075F SYST BP GE 130 - 139MM HG: CPT | Mod: GC

## 2024-05-20 PROCEDURE — 3079F DIAST BP 80-89 MM HG: CPT | Mod: GC

## 2024-05-20 PROCEDURE — 99214 OFFICE O/P EST MOD 30 MIN: CPT | Mod: GC

## 2024-05-20 RX ORDER — CHLORTHALIDONE 25 MG/1
TABLET ORAL
Qty: 90 TABLET | Refills: 3 | Status: SHIPPED | OUTPATIENT
Start: 2024-05-20

## 2024-05-20 ASSESSMENT — FIBROSIS 4 INDEX: FIB4 SCORE: 0.71

## 2024-05-20 NOTE — PROGRESS NOTES
Established Patient    Simi Massey is a 63 y.o. female who presents today with the following Chief Complaint(s): Follow up for Diagnoses of Chronic pain of right ankle, Hyperlipidemia, unspecified hyperlipidemia type, and Essential hypertension were pertinent to this visit.    HPI:  R ankle Pain:   Noticed R ankle pain a couple of months ago. Denied any specific injuries that she could remember, however, has been working on her house so has been going up/down ladder a lot which seems to have worsened her back pain and may also be related to her ankle pain. She said that she had tried ankle brace and it seemed to help initially but was no longer helping. She had been taking Tylenol, ibuprofen, and aspirin daily but had decreased and was now alternating medications and not taking daily. She had tried diclofenac gel without relief of sx but said she had been using castor oil with some relief. She also reported looking up some exercises online that had seemed to help but said she had not been doing them consistently. Declined PT for now but willing to consider in the future.     HLD:   Patient admitted to eating ice-cream daily and poor level of exercise due to frequent work. However, motivated to improve and plans to retire next year which she hopes will give her more time to care for herself.     HTN:   Requested refill BP medications     R Hip pain:   Mostly resolved     Screening Exams:   -Colonoscopy- November 2023 - scanned into file, showed mild diverticulosis with internal and external hemorrhoids- follow up recommended in 7 years as per patient. Next due 2030.   -yearly CT scheduled with lung nodule clinic.   -Mammogram scheduled 6/10 (always gets [facility]lando with ultrasound). BRCA negative.   -Pap- completed 3/13/2023, normal.       Past Medical History:   Diagnosis Date    Anxiety     Back pain     Chickenpox     Depression     [place] measles     Head ache     Hypertension     Influenza     Pulmonary nodule   "   Shingles      Social History     Tobacco Use    Smoking status: Former     Current packs/day: 0.00     Average packs/day: 1 pack/day for 44.0 years (44.0 ttl pk-yrs)     Types: Cigarettes     Start date: 1978     Quit date: 2022     Years since quittin.9    Smokeless tobacco: Never    Tobacco comments:     13y @ 3ppd, quit x 2, then 1ppd (avd 1.5 ppd)   Vaping Use    Vaping status: Never Used   Substance Use Topics    Alcohol use: No     Comment: once or twice a year     Drug use: No     Current Outpatient Medications   Medication Sig Dispense Refill    chlorthalidone (HYGROTON) 25 MG Tab Take one tab daily for blood pressure 90 Tablet 3    Red Yeast Rice Extract (RED YEAST RICE PO) Take  by mouth.      Coenzyme Q10 (COQ10 PO) Take  by mouth.      Multiple Vitamins-Minerals (OCUVITE PO) Take  by mouth.      Alpha-Lipoic Acid (LIPOIC ACID PO) Take  by mouth.      Cholecalciferol (D3 ADULT PO) Take  by mouth.      B Complex-Biotin-FA (B-COMPLEX PO) Take  by mouth.      ibuprofen (MOTRIN) 200 MG Tab Take 200 mg by mouth every 6 hours as needed. Up to 1600 mg      acetaminophen (TYLENOL) 500 MG Tab Take 500-1,000 mg by mouth every 6 hours as needed. Up to 1600 mg prn      aspirin (ASA) 325 MG Tab ASPIRIN 325 MG TABS       No current facility-administered medications for this visit.       Physical Exam  /82 (BP Location: Right arm, Patient Position: Sitting, BP Cuff Size: Adult)   Pulse 78   Temp 37 °C (98.6 °F) (Temporal)   Ht 1.549 m (5' 1\")   Wt 84.6 kg (186 lb 6.4 oz)   LMP  (LMP Unknown)   SpO2 95%   BMI 35.22 kg/m²     General:  Alert and oriented, No apparent distress.    Lungs: Clear to auscultation. No wheezes, rales, or rhonchi.     Cardiovascular: Regular rate and rhythm. No murmurs, rubs or gallops.    Abdomen:  Regular bowel sounds, nontender, no rebound or guarding noted.    Extremities: R ankle slightly swollen behind medial malleolus. No ligament laxity noted. Malleoli not " tender to palpation, midfoot and forefoot not tender to palpation.     Neuro: Aox4, strength 5/5 bilat upper and lower extremities.       Assessment and Plan:   1. Chronic pain of right ankle  Ddx suspect tendon instability due to overuse in setting of repetitive dorsiflexion from frequently ascending/descending ladder.   -No ligament laxity on physical exam so sprain unlikely.   -No point tenderness, and does not meet any Jasper Ankle rules so unlikely fracture and x-ray not indicated.   Plan:   - Referral to Orthopedics- specifically foot and ankle specialist for further assessment   -Offered PT, patient declined for now. Recommended home exercises/stretches     2. Hyperlipidemia, unspecified hyperlipidemia type  -Recent lipid panel 5/15 with Chol 206, Tri 61, HDL 65, .   -ASCVD of 6%.  Plan:   -I advise reducing sugars/carbohydrates/saturated fats/alcohol, and eating more vegetables and lean meats such as fish/chicken/turkey.   -I also recommend 30 minutes of cardiovascular exercise most days of the week.  -Discussed low dose statin, pt declined for now.   - Lipid Profile; Future to assess if improvement with lifestyle changes.     3. Essential hypertension  /82 in the clinic today. She denied any episodes of CP, palpitations, SOB, lightheadedness/dizziness, or blurred vision.  -Advised to monitor and record pressures 3x/week and bring BP log to upcoming appointment  -Advised goal is 120s/70s.   -Risks associated with uncontrolled htn discussed.   -Discussed Lifestyle factors to help manage blood pressure:  1) reduce stress with daily activity, consider yoga, breathing exercises (like 4-7-8 breathing technique)   2) reduce sodium to <2000 mg/day  3) DASH diet     4) 30 minutes of exercise each day   -Refilled chlorthalidone (HYGROTON) 25 MG Tab; Take one tab daily for blood pressure  Dispense: 90 Tablet; Refill: 3       Follow up: 3-6 months.     Brady Sam D.O. PGY I  [facility]  [employer]

## 2024-05-20 NOTE — PROGRESS NOTES
Teaching Physician Attestation      Level of Participation    I have personally interviewed and examined the patient.  In addition, I discussed with the resident physician the patient's history, exam, assessment and plan in detail.  Topics listed in my addendum were the focus of the visit.  Healthcare maintenance was not addressed this visit unless listed as a topic in my addendum.  I agree with the plan as written along with the following additions/modifications:        R medial ankle pain secondary to tendon/ligament instability, chronic  -Patient denies a clear inciting event, reports gradual increase in medial ankle clicking and discomfort, on exam there appears to be a visible prominence of possibly the tibialis posterior or flexor digitorum longus tendons, no point tenderness on the base of the medial maleolus or grossly on the medial arch near the ankle, able to ambulate.    Plan  -referral to foot and ankle for further eval, appreciate support    Bmi 35  -ascvd 6%, healthy eating handout given  -very mild transaminiits, would trend at f/u lab for possible nafld    HTN  -repeat 130/82 in setting of ankle pain, no sx reported.  -on chlorthalidone, cmp nl cr/k recently, continue chlorthalidone  -f/u within 3 mo.    Rtc 3 mo.  pcr

## 2024-06-10 ENCOUNTER — HOSPITAL ENCOUNTER (OUTPATIENT)
Dept: RADIOLOGY | Facility: MEDICAL CENTER | Age: 64
End: 2024-06-10
Attending: OBSTETRICS & GYNECOLOGY
Payer: COMMERCIAL

## 2024-06-10 DIAGNOSIS — Z12.31 VISIT FOR SCREENING MAMMOGRAM: ICD-10-CM

## 2024-06-10 DIAGNOSIS — R92.30 DENSE BREASTS: ICD-10-CM

## 2024-06-10 PROCEDURE — 77063 BREAST TOMOSYNTHESIS BI: CPT

## 2024-06-10 PROCEDURE — 76641 ULTRASOUND BREAST COMPLETE: CPT

## 2024-11-05 DIAGNOSIS — I10 ESSENTIAL HYPERTENSION: ICD-10-CM

## 2024-11-06 NOTE — TELEPHONE ENCOUNTER
Received request via: Pharmacy    Was the patient seen in the last year in this department? Yes    Does the patient have an active prescription (recently filled or refills available) for medication(s) requested? No    [facility]: [facility]    Does the patient have USP Plus and need 100-day supply? (This applies to ALL medications) Patient does not have SCP

## 2024-11-08 ENCOUNTER — OFFICE VISIT (OUTPATIENT)
Dept: INTERNAL MEDICINE | Facility: OTHER | Age: 64
End: 2024-11-08
Payer: COMMERCIAL

## 2024-11-08 VITALS
TEMPERATURE: 97.3 F | HEIGHT: 61 IN | SYSTOLIC BLOOD PRESSURE: 139 MMHG | OXYGEN SATURATION: 99 % | HEART RATE: 75 BPM | DIASTOLIC BLOOD PRESSURE: 84 MMHG | BODY MASS INDEX: 36.1 KG/M2 | WEIGHT: 191.2 LBS

## 2024-11-08 DIAGNOSIS — F43.21 SITUATIONAL DEPRESSION: ICD-10-CM

## 2024-11-08 DIAGNOSIS — Z13.228 SCREENING FOR METABOLIC DISORDER: ICD-10-CM

## 2024-11-08 PROCEDURE — 99213 OFFICE O/P EST LOW 20 MIN: CPT | Mod: GE

## 2024-11-08 PROCEDURE — 3079F DIAST BP 80-89 MM HG: CPT | Mod: GC

## 2024-11-08 PROCEDURE — 3075F SYST BP GE 130 - 139MM HG: CPT | Mod: GC

## 2024-11-08 ASSESSMENT — FIBROSIS 4 INDEX: FIB4 SCORE: 0.71

## 2024-11-08 NOTE — PROGRESS NOTES
Established Patient    Simi Massey is a 63 y.o. female who presents today with the following Chief Complaint(s): Follow up for Diagnoses of Situational depression and Screening for metabolic disorder were pertinent to this visit.    HPI:  Situational depression  Patient said that she was having a rough time at work due to difficult coworker. She said that her coworker was very judgmental and was frequently pointing out things that she didn't like with the patient. Unfortunately they share a small office and patient has been unable to move offices or work from home. She is planning to retire early (in approximately 1 year and 1 month) specifically so she can get away from this coworker. She said she is not interested in medication right now but is open to trying a therapist to discuss coping mechanisms. No SI, just depression due to bad situation.     Screening Exams:   -Colonoscopy- 2023 - scanned into file, showed mild diverticulosis with internal and external hemorrhoids- follow up recommended in 7 years as per patient. Next due .   -yearly CT scheduled with lung nodule clinic.   -Mammogram scheduled 6/10 (always gets [facility] with ultrasound). BRCA negative.   -Pap- 3/2024 WNL     Past Medical History:   Diagnosis Date    Anxiety     Back pain     Chickenpox     Depression     [place] Island[place] measles     Head ache     Hypertension     Influenza     Pulmonary nodule     Shingles      Social History     Tobacco Use    Smoking status: Former     Current packs/day: 0.00     Average packs/day: 1 pack/day for 44.0 years (44.0 ttl pk-yrs)     Types: Cigarettes     Start date: 1978     Quit date: 2022     Years since quittin.4    Smokeless tobacco: Never    Tobacco comments:     13y @ 3ppd, quit x 2, then 1ppd (avd 1.5 ppd)   Vaping Use    Vaping status: Never Used   Substance Use Topics    Alcohol use: No     Comment: once or twice a year     Drug use: No     Current Outpatient Medications  "  Medication Sig Dispense Refill    chlorthalidone (HYGROTON) 25 MG Tab Take one tab daily for blood pressure 90 Tablet 3    Red Yeast Rice Extract (RED YEAST RICE PO) Take  by mouth.      Coenzyme Q10 (COQ10 PO) Take  by mouth.      Multiple Vitamins-Minerals (OCUVITE PO) Take  by mouth.      Alpha-Lipoic Acid (LIPOIC ACID PO) Take  by mouth.      Cholecalciferol (D3 ADULT PO) Take  by mouth.      B Complex-Biotin-FA (B-COMPLEX PO) Take  by mouth.      ibuprofen (MOTRIN) 200 MG Tab Take 200 mg by mouth every 6 hours as needed. Up to 1600 mg      acetaminophen (TYLENOL) 500 MG Tab Take 500-1,000 mg by mouth every 6 hours as needed. Up to 1600 mg prn      aspirin (ASA) 325 MG Tab ASPIRIN 325 MG TABS       No current facility-administered medications for this visit.       Physical Exam  /84 (BP Location: Left arm, Patient Position: Sitting, BP Cuff Size: Adult)   Pulse 75   Temp 36.3 °C (97.3 °F) (Temporal)   Ht 1.549 m (5' 1\")   Wt 86.7 kg (191 lb 3.2 oz)   LMP  (LMP Unknown)   SpO2 99%   BMI 36.13 kg/m²     General:  Alert and oriented, No apparent distress.    Lungs: Clear to auscultation. No wheezes, rales, or rhonchi.     Cardiovascular: Regular rate and rhythm. No murmurs, rubs or gallops.    Abdomen:  Regular bowel sounds, nontender, no rebound or guarding noted.    Extremities: No edema noted.     Neuro: Aox4, strength 5/5 bilat upper and lower extremities.       Assessment and Plan:   1. Situational depression  Secondary to poor work environment   -declined medications for now   Plan:   Discussed:   -meditation  -guided imaging  -diaphragmatic breathing with the 4,7,8 breathing technique: Close your lips and inhale through your nose for a count of four. Hold your breath for a count of seven. Exhale completely through your mouth making a whoosh sound for a count of eight. This completes one cycle.  -Follow with behavioral therapy regarding cognitive behavioral therapy (CBT) and self-soothing " techniques.   - Referral to Behavioral Health    2. Screening for metabolic disorder  Labs to be completed prior to next appointment and will be reviewed then.   - Comp Metabolic Panel; Future  - Lipid Profile; Future  - HEMOGLOBIN A1C; Future       Chronic medical conditions not assessed at this appointment:  Chronic pain of right ankle  Ddx suspect tendon instability due to overuse in setting of repetitive dorsiflexion from frequently ascending/descending ladder.   -No ligament laxity on physical exam so sprain unlikely.   -No point tenderness, and does not meet any [place] Ankle rules so unlikely fracture and x-ray not indicated.   Plan:   - Referral to Orthopedics- specifically foot and ankle specialist for further assessment   -Offered PT, patient declined for now. Recommended home exercises/stretches     Hyperlipidemia, unspecified hyperlipidemia type  -Recent lipid panel 5/15 with Chol 206, Tri 61, HDL 65, .   -ASCVD of 6%.  Plan:   -I advise reducing sugars/carbohydrates/saturated fats/alcohol, and eating more vegetables and lean meats such as fish/chicken/turkey.   -I also recommend 30 minutes of cardiovascular exercise most days of the week.  -Discussed low dose statin, pt declined for now.   - Lipid Profile; Future to assess if improvement with lifestyle changes.     Essential hypertension  /82 in the clinic today. She denied any episodes of CP, palpitations, SOB, lightheadedness/dizziness, or blurred vision.  -Advised to monitor and record pressures 3x/week and bring BP log to upcoming appointment  -Advised goal is 120s/70s.   -Risks associated with uncontrolled htn discussed.   -Discussed Lifestyle factors to help manage blood pressure:  1) reduce stress with daily activity, consider yoga, breathing exercises (like 4-7-8 breathing technique)   2) reduce sodium to <2000 mg/day  3) DASH diet     4) 30 minutes of exercise each day   -Refilled chlorthalidone (HYGROTON) 25 MG Tab; Take one tab  daily for blood pressure  Dispense: 90 Tablet; Refill: 3     Follow up: 3-6 months. To review labs and follow up on situational depression     Brady Sam D.O. PGY 3  [facility]

## 2024-11-14 RX ORDER — CHLORTHALIDONE 25 MG/1
TABLET ORAL
Qty: 90 TABLET | Refills: 3 | Status: SHIPPED | OUTPATIENT
Start: 2024-11-14

## 2024-11-26 NOTE — ASSESSMENT & PLAN NOTE
-goal BP: <130/80.  At goal  -RF: age, sodium, family hx  -Meds: chlorthalidone 25mg.  Continue with current medication.   -Lifestyle modification counselling: Weight loss/ Dietary changes/ Sodium restriction <1.5g/d/limit alcohol intake <1 drink/day   - Encourage ongoing tobacco abstinence. Continue home bp monitoring   - Follow up in 4 months to re-establish care     yes

## 2024-12-03 ENCOUNTER — HOSPITAL ENCOUNTER (OUTPATIENT)
Dept: RADIOLOGY | Facility: MEDICAL CENTER | Age: 64
End: 2024-12-03
Attending: INTERNAL MEDICINE
Payer: COMMERCIAL

## 2024-12-03 DIAGNOSIS — Z87.891 RECENTLY QUIT USING TOBACCO: ICD-10-CM

## 2024-12-03 PROCEDURE — 71271 CT THORAX LUNG CANCER SCR C-: CPT

## 2025-02-25 ENCOUNTER — OFFICE VISIT (OUTPATIENT)
Dept: SLEEP MEDICINE | Facility: MEDICAL CENTER | Age: 65
End: 2025-02-25
Attending: NURSE PRACTITIONER
Payer: COMMERCIAL

## 2025-02-25 VITALS
HEART RATE: 73 BPM | WEIGHT: 190 LBS | BODY MASS INDEX: 35.87 KG/M2 | OXYGEN SATURATION: 95 % | HEIGHT: 61 IN | SYSTOLIC BLOOD PRESSURE: 132 MMHG | DIASTOLIC BLOOD PRESSURE: 84 MMHG

## 2025-02-25 DIAGNOSIS — R91.8 PULMONARY NODULES: ICD-10-CM

## 2025-02-25 DIAGNOSIS — Z87.891 FORMER SMOKER: ICD-10-CM

## 2025-02-25 PROCEDURE — 3079F DIAST BP 80-89 MM HG: CPT | Performed by: NURSE PRACTITIONER

## 2025-02-25 PROCEDURE — 99212 OFFICE O/P EST SF 10 MIN: CPT | Performed by: NURSE PRACTITIONER

## 2025-02-25 PROCEDURE — 3075F SYST BP GE 130 - 139MM HG: CPT | Performed by: NURSE PRACTITIONER

## 2025-02-25 ASSESSMENT — PATIENT HEALTH QUESTIONNAIRE - PHQ9: CLINICAL INTERPRETATION OF PHQ2 SCORE: 0

## 2025-02-25 NOTE — PROGRESS NOTES
Chief Complaint   Patient presents with    Follow-Up     Last seen 01/09/24 w/ Dr. Whiteside  Pulmonary nodules      Results     CT-LCS 12/03/24       HPI:  Simi Massey is a 65 y.o. year old female here today for follow-up on pulmonary nodules.  Last OV 1/9/24 with Dr. Whiteside     [facility] stGstrstastdstest:st st1st LDCT 12/3/24:  1. Stable 6 mm and 5 mm nodules in the right middle lobe. Stable 5 mm nodule posterior medially in right lower lobe.   Reviewed with patient. Recommend repeat in 1 year.    Significant changes in health in the interim.  She remains off cigarettes for 3 years now.  No respiratory complaints.  She walked [employer] with inclines and tolerated well the other day.    PULM History:  Former smoker, quit May 2022 with 44-pack-year history.  LDCT 9/17/18:  Hyperexpanded lungs. Small apical blebs.  Multiple pulmonary nodules measuring 6.5 mm or less are identified.  5.8 mm nodule right middle lobe image 93.  3.1 mm nodule right lower lobe image 95.  3.9 mm nodule right middle lobe image 98.  4.1 mm nodule right lower lobe image 101.  6.5 mm nodule right lower lobe image 103.  2.2 mm left major fissure nodule image 74.  2.5 mm nodule left lower lobe image 95.  4.9 mm nodule left lower lobe image 107.  1.8 mm nodule left lower lobe image 107.  2.6 mm nodule left lower lobe image 113.    ROS: As per HPI and otherwise negative if not stated.    Past Medical History:   Diagnosis Date    Anxiety     Back pain     Chickenpox     Depression     [place] measles     Head ache     Hypertension     Influenza     Pulmonary nodule     Shingles        Past Surgical History:   Procedure Laterality Date    APPENDECTOMY      1965       Family History   Problem Relation Age of Onset    Lung Disease Mother     Cancer Maternal Grandmother         Lung    Breast Cancer Daughter        Social History     Socioeconomic History    Marital status:      Spouse name: Not on file    Number of children: Not on file    Years of education:  "Not on file    Highest education level: Not on file   Occupational History    Not on file   Tobacco Use    Smoking status: Former     Current packs/day: 0.00     Average packs/day: 1 pack/day for 44.0 years (44.0 ttl pk-yrs)     Types: Cigarettes     Start date: 1978     Quit date: 2022     Years since quittin.7    Smokeless tobacco: Never    Tobacco comments:     13y @ 3ppd, quit x 2, then 1ppd (avd 1.5 ppd)   Vaping Use    Vaping status: Never Used   Substance and Sexual Activity    Alcohol use: No     Comment: once or twice a year     Drug use: No    Sexual activity: Yes     Partners: Male   Other Topics Concern    Not on file   Social History Narrative    Not on file     Social Drivers of Health     Financial Resource Strain: Not on file   Food Insecurity: Not on file   Transportation Needs: Not on file   Physical Activity: Not on file   Stress: Not on file   Social Connections: Not on file   Intimate Partner Violence: Not on file   Housing Stability: Not on file       Allergies as of 2025 - Reviewed 2025   Allergen Reaction Noted    Nkda [no known drug allergy]  2012        Vitals:  /84 (BP Location: Right arm, Patient Position: Sitting, BP Cuff Size: Adult)   Pulse 73   Ht 1.549 m (5' 1\")   Wt 86.2 kg (190 lb)   SpO2 95%     Current medications as of today   Current Outpatient Medications   Medication Sig Dispense Refill    chlorthalidone (HYGROTON) 25 MG Tab TAKE 1 TABLET BY MOUTH DAILY FOR BLOOD PRESSURE 90 Tablet 3    Red Yeast Rice Extract (RED YEAST RICE PO) Take  by mouth.      Coenzyme Q10 (COQ10 PO) Take  by mouth.      Multiple Vitamins-Minerals (OCUVITE PO) Take  by mouth.      Alpha-Lipoic Acid (LIPOIC ACID PO) Take  by mouth.      Cholecalciferol (D3 ADULT PO) Take  by mouth.      B Complex-Biotin-FA (B-COMPLEX PO) Take  by mouth.      ibuprofen (MOTRIN) 200 MG Tab Take 200 mg by mouth every 6 hours as needed. Up to 1600 mg      acetaminophen (TYLENOL) 500 " MG Tab Take 500-1,000 mg by mouth every 6 hours as needed. Up to 1600 mg prn      aspirin (ASA) 325 MG Tab ASPIRIN 325 MG TABS       No current facility-administered medications for this visit.         Physical Exam:   Gen:           Alert and oriented, No apparent distress. Mood and affect appropriate, normal interaction with examiner.  Eyes:          PERRL, EOM intact, sclere white, conjunctive moist.  Ears:          Not examined.   Hearing:     Grossly intact.  Nose:          Normal, no lesions or deformities.  Dentition:    Not examined.   Oropharynx:   Not examined.   Mallampati Classification: Not examined.   Neck:        Supple, trachea midline, no masses.  Respiratory Effort: No intercostal retractions or use of accessory muscles.   Lung Auscultation:      Clear to auscultation bilaterally; no rales, rhonchi or wheezing.  CV:            Regular rate and rhythm. No murmurs, rubs or gallops.  Abd:           Not examined.   Lymphadenopathy: Not examined.   Gait and Station: Normal.  Digits and Nails: No clubbing, cyanosis, petechiae, or nodes.   Cranial Nerves: II-XII grossly intact.  Skin:        No rashes, lesions or ulcers noted.               Ext:           No cyanosis or edema.      Assessment:  1. Pulmonary nodules  CT-LUNG CANCER-SCREENING      2. BMI 35.0-35.9,adult  HEIGHT AND WEIGHT      3. Former smoker  CT-LUNG CANCER-SCREENING               Immunizations:    Flu:recommend  Pneumovax 23:2019  Prevnar 13:2022  PCV 20: not due  COVID-19: 2021    Plan:  Pulmonary nodules are stable and less of them since initial screening September 2018.  She will continue with annual screening  Due December 2025 due to smoking history and risk factors.  Encourage weight loss healthy diet and activity.  Follow-up in 10 to 12 months after CT scan December 2025, sooner if needed.    Please note that this dictation was created using voice recognition software. I have made every reasonable attempt to correct obvious  errors, but it is possible there are errors of grammar and possibly content that I did not discover before finalizing the note.

## 2025-05-31 DIAGNOSIS — I10 ESSENTIAL HYPERTENSION: ICD-10-CM

## 2025-06-02 ENCOUNTER — APPOINTMENT (OUTPATIENT)
Dept: INTERNAL MEDICINE | Facility: OTHER | Age: 65
End: 2025-06-02
Payer: COMMERCIAL

## 2025-06-02 ENCOUNTER — OFFICE VISIT (OUTPATIENT)
Dept: INTERNAL MEDICINE | Facility: OTHER | Age: 65
End: 2025-06-02
Payer: COMMERCIAL

## 2025-06-02 VITALS
DIASTOLIC BLOOD PRESSURE: 70 MMHG | SYSTOLIC BLOOD PRESSURE: 124 MMHG | BODY MASS INDEX: 35.68 KG/M2 | HEART RATE: 89 BPM | TEMPERATURE: 97.9 F | WEIGHT: 189 LBS | HEIGHT: 61 IN | OXYGEN SATURATION: 93 %

## 2025-06-02 DIAGNOSIS — E78.5 HYPERLIPIDEMIA, UNSPECIFIED HYPERLIPIDEMIA TYPE: Primary | ICD-10-CM

## 2025-06-02 PROCEDURE — 3074F SYST BP LT 130 MM HG: CPT

## 2025-06-02 PROCEDURE — 99213 OFFICE O/P EST LOW 20 MIN: CPT | Mod: GE

## 2025-06-02 PROCEDURE — 3078F DIAST BP <80 MM HG: CPT

## 2025-06-02 RX ORDER — ATORVASTATIN CALCIUM 10 MG/1
10 TABLET, FILM COATED ORAL NIGHTLY
Qty: 90 TABLET | Refills: 1 | Status: SHIPPED | OUTPATIENT
Start: 2025-06-02 | End: 2026-07-07

## 2025-06-02 RX ORDER — CHLORTHALIDONE 25 MG/1
TABLET ORAL
Qty: 90 TABLET | Refills: 1 | Status: SHIPPED | OUTPATIENT
Start: 2025-06-02

## 2025-06-02 NOTE — PROGRESS NOTES
Established Patient    Simi Massey is a 63 y.o. female who presents today with the following Chief Complaint(s): Follow up for The encounter diagnosis was Hyperlipidemia, unspecified hyperlipidemia type.    HPI:  Patient said that since prior appointment her life had slowed down a little so she had been able to take care of herself a little better. She is still working and is still having a hard time with her co-worker but things are doing a little better in her life overall. She reported that she has been working on her diet/exercise as able but has been limited with everything going on in her life. She previously declined statin and expressed concern that she didn't want to be on a medication forever. However, willing to give it a try.     Screening Exams:   -Colonoscopy- November 2023 - Next due 2030.   -yearly CT scheduled with lung nodule clinic.   -Mammogram 6/10/2024 (always gets [facility] with ultrasound). BRCA negative.   -Pap- 3/2024 WNL     Past Medical History:   Diagnosis Date    Anxiety     Back pain     Chickenpox     Depression     [place] measles     Head ache     Hypertension     Influenza     Pulmonary nodule     Shingles      Social History     Tobacco Use    Smoking status: Former     Current packs/day: 0.00     Average packs/day: 1 pack/day for 44.0 years (44.0 ttl pk-yrs)     Types: Cigarettes     Start date: 5/28/1978     Quit date: 5/28/2022     Years since quitting: 3.0    Smokeless tobacco: Never    Tobacco comments:     13y @ 3ppd, quit x 2, then 1ppd (avd 1.5 ppd)   Vaping Use    Vaping status: Never Used   Substance Use Topics    Alcohol use: No     Comment: once or twice a year     Drug use: No     Current Outpatient Medications   Medication Sig Dispense Refill    chlorthalidone (HYGROTON) 25 MG Tab TAKE 1 TABLET BY MOUTH DAILY FOR BLOOD PRESSURE 90 Tablet 1    atorvastatin (LIPITOR) 10 MG Tab Take 1 Tablet by mouth every evening. 90 Tablet 1    Red Yeast Rice Extract (RED YEAST RICE  "PO) Take  by mouth.      Coenzyme Q10 (COQ10 PO) Take  by mouth.      Alpha-Lipoic Acid (LIPOIC ACID PO) Take  by mouth. (Patient taking differently: Take  by mouth as needed.)      Cholecalciferol (D3 ADULT PO) Take  by mouth.      B Complex-Biotin-FA (B-COMPLEX PO) Take  by mouth.      ibuprofen (MOTRIN) 200 MG Tab Take 200 mg by mouth every 6 hours as needed. Up to 1600 mg      acetaminophen (TYLENOL) 500 MG Tab Take 500-1,000 mg by mouth every 6 hours as needed. Up to 1600 mg prn      aspirin (ASA) 325 MG Tab ASPIRIN 325 MG TABS (Patient taking differently: as needed.)      Multiple Vitamins-Minerals (OCUVITE PO) Take  by mouth. (Patient not taking: Reported on 6/2/2025)       No current facility-administered medications for this visit.       Physical Exam  /70 (BP Location: Left arm, Patient Position: Sitting, BP Cuff Size: Adult)   Pulse 89   Temp 36.6 °C (97.9 °F) (Temporal)   Ht 1.549 m (5' 1\")   Wt 85.7 kg (189 lb)   LMP  (LMP Unknown)   SpO2 93%   BMI 35.71 kg/m²     General:  Alert and oriented, No apparent distress.    Lungs: Clear to auscultation. No wheezes, rales, or rhonchi.     Cardiovascular: Regular rate and rhythm. No murmurs, rubs or gallops.    Abdomen:  Regular bowel sounds, nontender, no rebound or guarding noted.    Extremities: No edema noted.     Neuro: Aox4, regular ambulation       Assessment and Plan:   1. Hyperlipidemia, unspecified hyperlipidemia type (Primary)  Discussed lab results, patient has been working on diet/exercise but still elevated cholesterol 227 on 5/28/25.   -ASCVD of 6%.  Plan:   -started atorvastatin (LIPITOR) 10 MG Tab; Take 1 Tablet by mouth every evening.  Dispense: 90 Tablet; Refill: 1  - Lipid Profile; Future  - Basic Metabolic Panel; Future       Chronic medical conditions not assessed at this appointment:  Chronic pain of right ankle  Ddx suspect tendon instability due to overuse in setting of repetitive dorsiflexion from frequently " ascending/descending ladder.   -No ligament laxity on physical exam so sprain unlikely.   -No point tenderness, and does not meet any [place] Ankle rules so unlikely fracture and x-ray not indicated.   Plan:   - Referral to Orthopedics- specifically foot and ankle specialist for further assessment   -Offered PT, patient declined for now. Recommended home exercises/stretches     Essential hypertension  /82 in the clinic today. She denied any episodes of CP, palpitations, SOB, lightheadedness/dizziness, or blurred vision.  -Advised to monitor and record pressures 3x/week and bring BP log to upcoming appointment  -Advised goal is 120s/70s.   -Risks associated with uncontrolled htn discussed.   -Discussed Lifestyle factors to help manage blood pressure:  1) reduce stress with daily activity, consider yoga, breathing exercises (like 4-7-8 breathing technique)   2) reduce sodium to <2000 mg/day  3) DASH diet     4) 30 minutes of exercise each day   -Refilled chlorthalidone (HYGROTON) 25 MG Tab; Take one tab daily for blood pressure  Dispense: 90 Tablet; Refill: 3     Follow up: 3-6 months to re-establish with new PCP and repeat lipid panel and BMP.     Brady Sam D.O. PGY 3  [facility] [employer]

## 2025-06-02 NOTE — TELEPHONE ENCOUNTER
Received request via: Pharmacy    Was the patient seen in the last year in this department? Yes    Does the patient have an active prescription (recently filled or refills available) for medication(s) requested? No    Pharmacy Name:   To be filled at: [id number] - [place], [place] - [address] AT [facility]          Does the patient have MCFP Plus and need 100-day supply? (This applies to ALL medications) Patient does not have SCP